# Patient Record
Sex: MALE | Race: BLACK OR AFRICAN AMERICAN | NOT HISPANIC OR LATINO | URBAN - METROPOLITAN AREA
[De-identification: names, ages, dates, MRNs, and addresses within clinical notes are randomized per-mention and may not be internally consistent; named-entity substitution may affect disease eponyms.]

---

## 2021-12-02 ENCOUNTER — INPATIENT (INPATIENT)
Facility: HOSPITAL | Age: 61
LOS: 2 days | Discharge: HOME | End: 2021-12-05
Attending: STUDENT IN AN ORGANIZED HEALTH CARE EDUCATION/TRAINING PROGRAM | Admitting: STUDENT IN AN ORGANIZED HEALTH CARE EDUCATION/TRAINING PROGRAM
Payer: MEDICAID

## 2021-12-02 VITALS
SYSTOLIC BLOOD PRESSURE: 117 MMHG | TEMPERATURE: 101 F | DIASTOLIC BLOOD PRESSURE: 79 MMHG | RESPIRATION RATE: 18 BRPM | OXYGEN SATURATION: 95 % | HEART RATE: 107 BPM

## 2021-12-02 LAB
ALBUMIN SERPL ELPH-MCNC: 3.2 G/DL — LOW (ref 3.3–5)
ALP SERPL-CCNC: 104 U/L — SIGNIFICANT CHANGE UP (ref 40–120)
ALT FLD-CCNC: 7 U/L — SIGNIFICANT CHANGE UP (ref 4–41)
ANION GAP SERPL CALC-SCNC: 10 MMOL/L — SIGNIFICANT CHANGE UP (ref 7–14)
AST SERPL-CCNC: 9 U/L — SIGNIFICANT CHANGE UP (ref 4–40)
BASE EXCESS BLDV CALC-SCNC: 4.9 MMOL/L — HIGH (ref -2–3)
BASOPHILS # BLD AUTO: 0.06 K/UL — SIGNIFICANT CHANGE UP (ref 0–0.2)
BASOPHILS NFR BLD AUTO: 0.2 % — SIGNIFICANT CHANGE UP (ref 0–2)
BILIRUB SERPL-MCNC: 0.6 MG/DL — SIGNIFICANT CHANGE UP (ref 0.2–1.2)
BLOOD GAS VENOUS COMPREHENSIVE RESULT: SIGNIFICANT CHANGE UP
BUN SERPL-MCNC: 16 MG/DL — SIGNIFICANT CHANGE UP (ref 7–23)
CALCIUM SERPL-MCNC: 8.9 MG/DL — SIGNIFICANT CHANGE UP (ref 8.4–10.5)
CHLORIDE BLDV-SCNC: 98 MMOL/L — SIGNIFICANT CHANGE UP (ref 96–108)
CHLORIDE SERPL-SCNC: 99 MMOL/L — SIGNIFICANT CHANGE UP (ref 98–107)
CO2 BLDV-SCNC: 30.5 MMOL/L — HIGH (ref 22–26)
CO2 SERPL-SCNC: 26 MMOL/L — SIGNIFICANT CHANGE UP (ref 22–31)
CREAT SERPL-MCNC: 0.96 MG/DL — SIGNIFICANT CHANGE UP (ref 0.5–1.3)
EOSINOPHIL # BLD AUTO: 0.02 K/UL — SIGNIFICANT CHANGE UP (ref 0–0.5)
EOSINOPHIL NFR BLD AUTO: 0.1 % — SIGNIFICANT CHANGE UP (ref 0–6)
GAS PNL BLDV: 130 MMOL/L — LOW (ref 136–145)
GAS PNL BLDV: SIGNIFICANT CHANGE UP
GLUCOSE BLDV-MCNC: 102 MG/DL — HIGH (ref 70–99)
GLUCOSE SERPL-MCNC: 100 MG/DL — HIGH (ref 70–99)
HCO3 BLDV-SCNC: 29 MMOL/L — SIGNIFICANT CHANGE UP (ref 22–29)
HCT VFR BLD CALC: 43.1 % — SIGNIFICANT CHANGE UP (ref 39–50)
HCT VFR BLDA CALC: 42 % — SIGNIFICANT CHANGE UP (ref 39–51)
HGB BLD CALC-MCNC: 14 G/DL — SIGNIFICANT CHANGE UP (ref 13–17)
HGB BLD-MCNC: 14.1 G/DL — SIGNIFICANT CHANGE UP (ref 13–17)
IANC: 21.38 K/UL — HIGH (ref 1.5–8.5)
IMM GRANULOCYTES NFR BLD AUTO: 0.8 % — SIGNIFICANT CHANGE UP (ref 0–1.5)
LACTATE BLDV-MCNC: 2.3 MMOL/L — HIGH (ref 0.5–2)
LIDOCAIN IGE QN: 11 U/L — SIGNIFICANT CHANGE UP (ref 7–60)
LYMPHOCYTES # BLD AUTO: 1.75 K/UL — SIGNIFICANT CHANGE UP (ref 1–3.3)
LYMPHOCYTES # BLD AUTO: 7.1 % — LOW (ref 13–44)
MCHC RBC-ENTMCNC: 28.6 PG — SIGNIFICANT CHANGE UP (ref 27–34)
MCHC RBC-ENTMCNC: 32.7 GM/DL — SIGNIFICANT CHANGE UP (ref 32–36)
MCV RBC AUTO: 87.4 FL — SIGNIFICANT CHANGE UP (ref 80–100)
MONOCYTES # BLD AUTO: 1.36 K/UL — HIGH (ref 0–0.9)
MONOCYTES NFR BLD AUTO: 5.5 % — SIGNIFICANT CHANGE UP (ref 2–14)
NEUTROPHILS # BLD AUTO: 21.38 K/UL — HIGH (ref 1.8–7.4)
NEUTROPHILS NFR BLD AUTO: 86.3 % — HIGH (ref 43–77)
NRBC # BLD: 0 /100 WBCS — SIGNIFICANT CHANGE UP
NRBC # FLD: 0 K/UL — SIGNIFICANT CHANGE UP
PCO2 BLDV: 41 MMHG — LOW (ref 42–55)
PH BLDV: 7.46 — HIGH (ref 7.32–7.43)
PLATELET # BLD AUTO: 292 K/UL — SIGNIFICANT CHANGE UP (ref 150–400)
PO2 BLDV: 50 MMHG — SIGNIFICANT CHANGE UP
POTASSIUM BLDV-SCNC: 5.3 MMOL/L — HIGH (ref 3.5–5.1)
POTASSIUM SERPL-MCNC: 4.6 MMOL/L — SIGNIFICANT CHANGE UP (ref 3.5–5.3)
POTASSIUM SERPL-SCNC: 4.6 MMOL/L — SIGNIFICANT CHANGE UP (ref 3.5–5.3)
PROT SERPL-MCNC: 6.4 G/DL — SIGNIFICANT CHANGE UP (ref 6–8.3)
RBC # BLD: 4.93 M/UL — SIGNIFICANT CHANGE UP (ref 4.2–5.8)
RBC # FLD: 12.5 % — SIGNIFICANT CHANGE UP (ref 10.3–14.5)
SAO2 % BLDV: 85 % — SIGNIFICANT CHANGE UP
SODIUM SERPL-SCNC: 135 MMOL/L — SIGNIFICANT CHANGE UP (ref 135–145)
WBC # BLD: 24.77 K/UL — HIGH (ref 3.8–10.5)
WBC # FLD AUTO: 24.77 K/UL — HIGH (ref 3.8–10.5)

## 2021-12-02 PROCEDURE — 93010 ELECTROCARDIOGRAM REPORT: CPT

## 2021-12-02 PROCEDURE — 99284 EMERGENCY DEPT VISIT MOD MDM: CPT | Mod: 25

## 2021-12-02 PROCEDURE — 71046 X-RAY EXAM CHEST 2 VIEWS: CPT | Mod: 26

## 2021-12-02 RX ORDER — MORPHINE SULFATE 50 MG/1
4 CAPSULE, EXTENDED RELEASE ORAL ONCE
Refills: 0 | Status: DISCONTINUED | OUTPATIENT
Start: 2021-12-02 | End: 2021-12-02

## 2021-12-02 RX ORDER — SODIUM CHLORIDE 9 MG/ML
1000 INJECTION, SOLUTION INTRAVENOUS ONCE
Refills: 0 | Status: COMPLETED | OUTPATIENT
Start: 2021-12-02 | End: 2021-12-02

## 2021-12-02 RX ORDER — PIPERACILLIN AND TAZOBACTAM 4; .5 G/20ML; G/20ML
3.38 INJECTION, POWDER, LYOPHILIZED, FOR SOLUTION INTRAVENOUS ONCE
Refills: 0 | Status: COMPLETED | OUTPATIENT
Start: 2021-12-02 | End: 2021-12-02

## 2021-12-02 RX ADMIN — MORPHINE SULFATE 4 MILLIGRAM(S): 50 CAPSULE, EXTENDED RELEASE ORAL at 22:57

## 2021-12-02 RX ADMIN — MORPHINE SULFATE 4 MILLIGRAM(S): 50 CAPSULE, EXTENDED RELEASE ORAL at 21:01

## 2021-12-02 RX ADMIN — MORPHINE SULFATE 4 MILLIGRAM(S): 50 CAPSULE, EXTENDED RELEASE ORAL at 23:13

## 2021-12-02 RX ADMIN — PIPERACILLIN AND TAZOBACTAM 200 GRAM(S): 4; .5 INJECTION, POWDER, LYOPHILIZED, FOR SOLUTION INTRAVENOUS at 23:53

## 2021-12-02 RX ADMIN — SODIUM CHLORIDE 1000 MILLILITER(S): 9 INJECTION, SOLUTION INTRAVENOUS at 22:21

## 2021-12-02 NOTE — ED ADULT NURSE NOTE - NSIMPLEMENTINTERV_GEN_ALL_ED
Implemented All Universal Safety Interventions:  [place] to call system. Call bell, personal items and telephone within reach. Instruct patient to call for assistance. Room bathroom lighting operational. Non-slip footwear when patient is off stretcher. Physically safe environment: no spills, clutter or unnecessary equipment. Stretcher in lowest position, wheels locked, appropriate side rails in place.

## 2021-12-02 NOTE — ED PROVIDER NOTE - ATTENDING CONTRIBUTION TO CARE
Tay Finley DO:  patient seen and evaluated with the PA.  I was present for key portions of the History & Physical, and I agree with the Impression & Plan. 60 yo m pmh IDDM, cholecystectomy, crack cocaine abuse, pw one month of abd pain. diffuse, worse in epigastric, unk exacerbating/remitting sx. no prior hx of similar sx. associated w/ loose stools. denies frequent etoh use. reports intermittent med compliance including insulin, states he just does not want to take the meds. reports 10-20 lb non intentional weight loss in last month, due to decreased appetite. may be having night sweats, unsure. arrives hds, well appearing. mild diffuse abd ttp and guarding. will eval for pancreatitis, possible CA, colitis less likely. will likely require GI fu if w/u non actionable and pain controlled.

## 2021-12-02 NOTE — ED PROVIDER NOTE - CLINICAL SUMMARY MEDICAL DECISION MAKING FREE TEXT BOX
62 y/o M pmh IDDM, cholecystectomy, crack cocaine abuse c/o epigastric abd pain x 1 month. Intermittent, worse with movement. Pt also endorsing decreased appetite.     r/o pancreatitis  -labs, ua, ctap, morphine

## 2021-12-02 NOTE — ED ADULT NURSE NOTE - OBJECTIVE STATEMENT
Patient received to room 4. Patient is a&ox4 ambulatory at baseline. Patient c/o of abdominal pain for 3 weeks. Patient pmh of cholecystomy, cocaine use. Patient has IV placed morphine given . denies chest pain, n/v.

## 2021-12-02 NOTE — ED PROVIDER NOTE - OBJECTIVE STATEMENT
60 y/o M pmh IDDM, cholecystectomy, crack cocaine abuse c/o epigastric abd pain x 1 month. Intermittent, worse with movement. Described as burning, sharp. Did not take anything for pain. Pt also endorsing decreased appetite. Had nausea and one episode of vomiting today. Pt endorsing constipation relieved with a laxative. Pt reports poor with insulin. C/o b/l rib pain and back pain. Pt is vaccinated, Denies fever, chills, cp, sob, dysuria, hematuria.

## 2021-12-02 NOTE — ED ADULT TRIAGE NOTE - CCCP TRG CHIEF CMPLNT
started 2 weeks ago was at MD office for eval of N&V and abd pain and some chest discomfort/abdominal pain

## 2021-12-02 NOTE — ED ADULT TRIAGE NOTE - CHIEF COMPLAINT QUOTE
md called EMS for pt to be brought to hospital for further eval f/s in field 200   IVF 200cc n/s #20 left fore arm  had hematuria several months ago and smokes crack cocaine yesterday

## 2021-12-02 NOTE — ED PROVIDER NOTE - PROGRESS NOTE DETAILS
FRAN Arellano: Pt was reassessed, still in pain, discussed case with surgery, will eval pt shortly. July Pritchard MD, Attending: Patient received at attending sign out from Dr. Sánchez, intra abd abcess, right lowr abd, surgical vs. IR drain, pt is placed NPO, surgery consult has been placed. rpt lactate, fluid, zosyn will be given q6.

## 2021-12-03 ENCOUNTER — TRANSCRIPTION ENCOUNTER (OUTPATIENT)
Age: 61
End: 2021-12-03

## 2021-12-03 DIAGNOSIS — K65.1 PERITONEAL ABSCESS: ICD-10-CM

## 2021-12-03 DIAGNOSIS — Z90.49 ACQUIRED ABSENCE OF OTHER SPECIFIED PARTS OF DIGESTIVE TRACT: Chronic | ICD-10-CM

## 2021-12-03 LAB
ANION GAP SERPL CALC-SCNC: 9 MMOL/L — SIGNIFICANT CHANGE UP (ref 7–14)
APTT BLD: 37.7 SEC — HIGH (ref 27–36.3)
B PERT DNA SPEC QL NAA+PROBE: SIGNIFICANT CHANGE UP
B PERT+PARAPERT DNA PNL SPEC NAA+PROBE: SIGNIFICANT CHANGE UP
BASE EXCESS BLDV CALC-SCNC: 2.2 MMOL/L — SIGNIFICANT CHANGE UP (ref -2–3)
BLD GP AB SCN SERPL QL: NEGATIVE — SIGNIFICANT CHANGE UP
BLOOD GAS VENOUS COMPREHENSIVE RESULT: SIGNIFICANT CHANGE UP
BORDETELLA PARAPERTUSSIS (RAPRVP): SIGNIFICANT CHANGE UP
BUN SERPL-MCNC: 12 MG/DL — SIGNIFICANT CHANGE UP (ref 7–23)
C PNEUM DNA SPEC QL NAA+PROBE: SIGNIFICANT CHANGE UP
CALCIUM SERPL-MCNC: 8.8 MG/DL — SIGNIFICANT CHANGE UP (ref 8.4–10.5)
CHLORIDE BLDV-SCNC: 99 MMOL/L — SIGNIFICANT CHANGE UP (ref 96–108)
CHLORIDE SERPL-SCNC: 98 MMOL/L — SIGNIFICANT CHANGE UP (ref 98–107)
CO2 BLDV-SCNC: 28.6 MMOL/L — HIGH (ref 22–26)
CO2 SERPL-SCNC: 27 MMOL/L — SIGNIFICANT CHANGE UP (ref 22–31)
CREAT SERPL-MCNC: 0.96 MG/DL — SIGNIFICANT CHANGE UP (ref 0.5–1.3)
FLUAV SUBTYP SPEC NAA+PROBE: SIGNIFICANT CHANGE UP
FLUBV RNA SPEC QL NAA+PROBE: SIGNIFICANT CHANGE UP
GAS PNL BLDV: 130 MMOL/L — LOW (ref 136–145)
GLUCOSE BLDC GLUCOMTR-MCNC: 139 MG/DL — HIGH (ref 70–99)
GLUCOSE BLDC GLUCOMTR-MCNC: 245 MG/DL — HIGH (ref 70–99)
GLUCOSE BLDC GLUCOMTR-MCNC: 297 MG/DL — HIGH (ref 70–99)
GLUCOSE BLDC GLUCOMTR-MCNC: 73 MG/DL — SIGNIFICANT CHANGE UP (ref 70–99)
GLUCOSE BLDC GLUCOMTR-MCNC: 96 MG/DL — SIGNIFICANT CHANGE UP (ref 70–99)
GLUCOSE BLDV-MCNC: 73 MG/DL — SIGNIFICANT CHANGE UP (ref 70–99)
GLUCOSE SERPL-MCNC: 135 MG/DL — HIGH (ref 70–99)
GRAM STN FLD: SIGNIFICANT CHANGE UP
HADV DNA SPEC QL NAA+PROBE: SIGNIFICANT CHANGE UP
HCO3 BLDV-SCNC: 27 MMOL/L — SIGNIFICANT CHANGE UP (ref 22–29)
HCOV 229E RNA SPEC QL NAA+PROBE: SIGNIFICANT CHANGE UP
HCOV HKU1 RNA SPEC QL NAA+PROBE: SIGNIFICANT CHANGE UP
HCOV NL63 RNA SPEC QL NAA+PROBE: SIGNIFICANT CHANGE UP
HCOV OC43 RNA SPEC QL NAA+PROBE: SIGNIFICANT CHANGE UP
HCT VFR BLD CALC: 40.9 % — SIGNIFICANT CHANGE UP (ref 39–50)
HCT VFR BLDA CALC: 40 % — SIGNIFICANT CHANGE UP (ref 39–51)
HGB BLD CALC-MCNC: 13.4 G/DL — SIGNIFICANT CHANGE UP (ref 13–17)
HGB BLD-MCNC: 13.7 G/DL — SIGNIFICANT CHANGE UP (ref 13–17)
HIV 1+2 AB+HIV1 P24 AG SERPL QL IA: SIGNIFICANT CHANGE UP
HMPV RNA SPEC QL NAA+PROBE: SIGNIFICANT CHANGE UP
HPIV1 RNA SPEC QL NAA+PROBE: SIGNIFICANT CHANGE UP
HPIV2 RNA SPEC QL NAA+PROBE: SIGNIFICANT CHANGE UP
HPIV3 RNA SPEC QL NAA+PROBE: SIGNIFICANT CHANGE UP
HPIV4 RNA SPEC QL NAA+PROBE: SIGNIFICANT CHANGE UP
INR BLD: 1.3 RATIO — HIGH (ref 0.88–1.16)
LACTATE BLDV-MCNC: 0.8 MMOL/L — SIGNIFICANT CHANGE UP (ref 0.5–2)
LACTATE BLDV-MCNC: 2.1 MMOL/L — HIGH (ref 0.5–2)
M PNEUMO DNA SPEC QL NAA+PROBE: SIGNIFICANT CHANGE UP
MAGNESIUM SERPL-MCNC: 2.1 MG/DL — SIGNIFICANT CHANGE UP (ref 1.6–2.6)
MCHC RBC-ENTMCNC: 28.7 PG — SIGNIFICANT CHANGE UP (ref 27–34)
MCHC RBC-ENTMCNC: 33.5 GM/DL — SIGNIFICANT CHANGE UP (ref 32–36)
MCV RBC AUTO: 85.7 FL — SIGNIFICANT CHANGE UP (ref 80–100)
NRBC # BLD: 0 /100 WBCS — SIGNIFICANT CHANGE UP
NRBC # FLD: 0 K/UL — SIGNIFICANT CHANGE UP
PCO2 BLDV: 43 MMHG — SIGNIFICANT CHANGE UP (ref 42–55)
PH BLDV: 7.41 — SIGNIFICANT CHANGE UP (ref 7.32–7.43)
PHOSPHATE SERPL-MCNC: 3.2 MG/DL — SIGNIFICANT CHANGE UP (ref 2.5–4.5)
PLATELET # BLD AUTO: 268 K/UL — SIGNIFICANT CHANGE UP (ref 150–400)
PO2 BLDV: 70 MMHG — SIGNIFICANT CHANGE UP
POTASSIUM BLDV-SCNC: 4 MMOL/L — SIGNIFICANT CHANGE UP (ref 3.5–5.1)
POTASSIUM SERPL-MCNC: 4.4 MMOL/L — SIGNIFICANT CHANGE UP (ref 3.5–5.3)
POTASSIUM SERPL-SCNC: 4.4 MMOL/L — SIGNIFICANT CHANGE UP (ref 3.5–5.3)
PROTHROM AB SERPL-ACNC: 14.8 SEC — HIGH (ref 10.6–13.6)
RAPID RVP RESULT: SIGNIFICANT CHANGE UP
RBC # BLD: 4.77 M/UL — SIGNIFICANT CHANGE UP (ref 4.2–5.8)
RBC # FLD: 12.7 % — SIGNIFICANT CHANGE UP (ref 10.3–14.5)
RH IG SCN BLD-IMP: POSITIVE — SIGNIFICANT CHANGE UP
RSV RNA SPEC QL NAA+PROBE: SIGNIFICANT CHANGE UP
RV+EV RNA SPEC QL NAA+PROBE: SIGNIFICANT CHANGE UP
SAO2 % BLDV: 94.6 % — SIGNIFICANT CHANGE UP
SARS-COV-2 RNA SPEC QL NAA+PROBE: SIGNIFICANT CHANGE UP
SODIUM SERPL-SCNC: 134 MMOL/L — LOW (ref 135–145)
SPECIMEN SOURCE: SIGNIFICANT CHANGE UP
WBC # BLD: 22.08 K/UL — HIGH (ref 3.8–10.5)
WBC # FLD AUTO: 22.08 K/UL — HIGH (ref 3.8–10.5)

## 2021-12-03 PROCEDURE — G1004: CPT

## 2021-12-03 PROCEDURE — 49406 IMAGE CATH FLUID PERI/RETRO: CPT

## 2021-12-03 PROCEDURE — 74177 CT ABD & PELVIS W/CONTRAST: CPT | Mod: 26,MG

## 2021-12-03 PROCEDURE — 99222 1ST HOSP IP/OBS MODERATE 55: CPT | Mod: GC

## 2021-12-03 RX ORDER — ENOXAPARIN SODIUM 100 MG/ML
40 INJECTION SUBCUTANEOUS EVERY 24 HOURS
Refills: 0 | Status: DISCONTINUED | OUTPATIENT
Start: 2021-12-03 | End: 2021-12-05

## 2021-12-03 RX ORDER — OXYCODONE HYDROCHLORIDE 5 MG/1
5 TABLET ORAL EVERY 4 HOURS
Refills: 0 | Status: DISCONTINUED | OUTPATIENT
Start: 2021-12-03 | End: 2021-12-04

## 2021-12-03 RX ORDER — DEXTROSE MONOHYDRATE, SODIUM CHLORIDE, AND POTASSIUM CHLORIDE 50; .745; 4.5 G/1000ML; G/1000ML; G/1000ML
1000 INJECTION, SOLUTION INTRAVENOUS
Refills: 0 | Status: DISCONTINUED | OUTPATIENT
Start: 2021-12-03 | End: 2021-12-04

## 2021-12-03 RX ORDER — DEXTROSE 50 % IN WATER 50 %
25 SYRINGE (ML) INTRAVENOUS ONCE
Refills: 0 | Status: DISCONTINUED | OUTPATIENT
Start: 2021-12-03 | End: 2021-12-03

## 2021-12-03 RX ORDER — DEXTROSE 50 % IN WATER 50 %
25 SYRINGE (ML) INTRAVENOUS ONCE
Refills: 0 | Status: COMPLETED | OUTPATIENT
Start: 2021-12-03 | End: 2021-12-03

## 2021-12-03 RX ORDER — HYDROMORPHONE HYDROCHLORIDE 2 MG/ML
1 INJECTION INTRAMUSCULAR; INTRAVENOUS; SUBCUTANEOUS ONCE
Refills: 0 | Status: DISCONTINUED | OUTPATIENT
Start: 2021-12-03 | End: 2021-12-03

## 2021-12-03 RX ORDER — GABAPENTIN 400 MG/1
300 CAPSULE ORAL THREE TIMES A DAY
Refills: 0 | Status: DISCONTINUED | OUTPATIENT
Start: 2021-12-03 | End: 2021-12-05

## 2021-12-03 RX ORDER — INSULIN LISPRO 100/ML
VIAL (ML) SUBCUTANEOUS EVERY 4 HOURS
Refills: 0 | Status: DISCONTINUED | OUTPATIENT
Start: 2021-12-03 | End: 2021-12-03

## 2021-12-03 RX ORDER — HYDROMORPHONE HYDROCHLORIDE 2 MG/ML
0.25 INJECTION INTRAMUSCULAR; INTRAVENOUS; SUBCUTANEOUS EVERY 4 HOURS
Refills: 0 | Status: DISCONTINUED | OUTPATIENT
Start: 2021-12-03 | End: 2021-12-03

## 2021-12-03 RX ORDER — HYDROMORPHONE HYDROCHLORIDE 2 MG/ML
0.5 INJECTION INTRAMUSCULAR; INTRAVENOUS; SUBCUTANEOUS EVERY 4 HOURS
Refills: 0 | Status: DISCONTINUED | OUTPATIENT
Start: 2021-12-03 | End: 2021-12-03

## 2021-12-03 RX ORDER — INSULIN GLARGINE 100 [IU]/ML
20 INJECTION, SOLUTION SUBCUTANEOUS AT BEDTIME
Refills: 0 | Status: DISCONTINUED | OUTPATIENT
Start: 2021-12-03 | End: 2021-12-03

## 2021-12-03 RX ORDER — KETOROLAC TROMETHAMINE 30 MG/ML
15 SYRINGE (ML) INJECTION EVERY 6 HOURS
Refills: 0 | Status: DISCONTINUED | OUTPATIENT
Start: 2021-12-03 | End: 2021-12-04

## 2021-12-03 RX ORDER — SODIUM CHLORIDE 9 MG/ML
1000 INJECTION, SOLUTION INTRAVENOUS
Refills: 0 | Status: DISCONTINUED | OUTPATIENT
Start: 2021-12-03 | End: 2021-12-03

## 2021-12-03 RX ORDER — DEXTROSE 50 % IN WATER 50 %
15 SYRINGE (ML) INTRAVENOUS ONCE
Refills: 0 | Status: DISCONTINUED | OUTPATIENT
Start: 2021-12-03 | End: 2021-12-03

## 2021-12-03 RX ORDER — PIPERACILLIN AND TAZOBACTAM 4; .5 G/20ML; G/20ML
3.38 INJECTION, POWDER, LYOPHILIZED, FOR SOLUTION INTRAVENOUS EVERY 8 HOURS
Refills: 0 | Status: DISCONTINUED | OUTPATIENT
Start: 2021-12-03 | End: 2021-12-05

## 2021-12-03 RX ORDER — GLUCAGON INJECTION, SOLUTION 0.5 MG/.1ML
1 INJECTION, SOLUTION SUBCUTANEOUS ONCE
Refills: 0 | Status: DISCONTINUED | OUTPATIENT
Start: 2021-12-03 | End: 2021-12-03

## 2021-12-03 RX ORDER — ACETAMINOPHEN 500 MG
975 TABLET ORAL EVERY 6 HOURS
Refills: 0 | Status: DISCONTINUED | OUTPATIENT
Start: 2021-12-03 | End: 2021-12-05

## 2021-12-03 RX ORDER — KETOROLAC TROMETHAMINE 30 MG/ML
15 SYRINGE (ML) INJECTION EVERY 6 HOURS
Refills: 0 | Status: DISCONTINUED | OUTPATIENT
Start: 2021-12-03 | End: 2021-12-03

## 2021-12-03 RX ORDER — INSULIN LISPRO 100/ML
VIAL (ML) SUBCUTANEOUS
Refills: 0 | Status: DISCONTINUED | OUTPATIENT
Start: 2021-12-03 | End: 2021-12-04

## 2021-12-03 RX ORDER — DEXTROSE 50 % IN WATER 50 %
12.5 SYRINGE (ML) INTRAVENOUS ONCE
Refills: 0 | Status: DISCONTINUED | OUTPATIENT
Start: 2021-12-03 | End: 2021-12-03

## 2021-12-03 RX ORDER — ACETAMINOPHEN 500 MG
1000 TABLET ORAL EVERY 6 HOURS
Refills: 0 | Status: DISCONTINUED | OUTPATIENT
Start: 2021-12-03 | End: 2021-12-03

## 2021-12-03 RX ORDER — INSULIN LISPRO 100/ML
VIAL (ML) SUBCUTANEOUS EVERY 6 HOURS
Refills: 0 | Status: DISCONTINUED | OUTPATIENT
Start: 2021-12-03 | End: 2021-12-03

## 2021-12-03 RX ADMIN — Medication 1000 MILLIGRAM(S): at 08:34

## 2021-12-03 RX ADMIN — OXYCODONE HYDROCHLORIDE 5 MILLIGRAM(S): 5 TABLET ORAL at 22:50

## 2021-12-03 RX ADMIN — ENOXAPARIN SODIUM 40 MILLIGRAM(S): 100 INJECTION SUBCUTANEOUS at 17:14

## 2021-12-03 RX ADMIN — GABAPENTIN 300 MILLIGRAM(S): 400 CAPSULE ORAL at 05:40

## 2021-12-03 RX ADMIN — GABAPENTIN 300 MILLIGRAM(S): 400 CAPSULE ORAL at 21:53

## 2021-12-03 RX ADMIN — PIPERACILLIN AND TAZOBACTAM 25 GRAM(S): 4; .5 INJECTION, POWDER, LYOPHILIZED, FOR SOLUTION INTRAVENOUS at 07:45

## 2021-12-03 RX ADMIN — HYDROMORPHONE HYDROCHLORIDE 1 MILLIGRAM(S): 2 INJECTION INTRAMUSCULAR; INTRAVENOUS; SUBCUTANEOUS at 03:01

## 2021-12-03 RX ADMIN — OXYCODONE HYDROCHLORIDE 5 MILLIGRAM(S): 5 TABLET ORAL at 16:10

## 2021-12-03 RX ADMIN — DEXTROSE MONOHYDRATE, SODIUM CHLORIDE, AND POTASSIUM CHLORIDE 100 MILLILITER(S): 50; .745; 4.5 INJECTION, SOLUTION INTRAVENOUS at 05:40

## 2021-12-03 RX ADMIN — Medication 25 GRAM(S): at 05:00

## 2021-12-03 RX ADMIN — SODIUM CHLORIDE 1000 MILLILITER(S): 9 INJECTION, SOLUTION INTRAVENOUS at 00:20

## 2021-12-03 RX ADMIN — Medication 400 MILLIGRAM(S): at 08:04

## 2021-12-03 RX ADMIN — DEXTROSE MONOHYDRATE, SODIUM CHLORIDE, AND POTASSIUM CHLORIDE 100 MILLILITER(S): 50; .745; 4.5 INJECTION, SOLUTION INTRAVENOUS at 21:53

## 2021-12-03 RX ADMIN — Medication 3: at 22:29

## 2021-12-03 RX ADMIN — Medication 2: at 17:10

## 2021-12-03 RX ADMIN — Medication 15 MILLIGRAM(S): at 23:33

## 2021-12-03 RX ADMIN — Medication 975 MILLIGRAM(S): at 17:10

## 2021-12-03 RX ADMIN — OXYCODONE HYDROCHLORIDE 5 MILLIGRAM(S): 5 TABLET ORAL at 15:27

## 2021-12-03 RX ADMIN — GABAPENTIN 300 MILLIGRAM(S): 400 CAPSULE ORAL at 15:00

## 2021-12-03 RX ADMIN — PIPERACILLIN AND TAZOBACTAM 25 GRAM(S): 4; .5 INJECTION, POWDER, LYOPHILIZED, FOR SOLUTION INTRAVENOUS at 15:01

## 2021-12-03 RX ADMIN — Medication 15 MILLIGRAM(S): at 17:54

## 2021-12-03 RX ADMIN — Medication 15 MILLIGRAM(S): at 17:10

## 2021-12-03 RX ADMIN — PIPERACILLIN AND TAZOBACTAM 25 GRAM(S): 4; .5 INJECTION, POWDER, LYOPHILIZED, FOR SOLUTION INTRAVENOUS at 21:53

## 2021-12-03 RX ADMIN — Medication 975 MILLIGRAM(S): at 17:54

## 2021-12-03 RX ADMIN — Medication 975 MILLIGRAM(S): at 23:33

## 2021-12-03 RX ADMIN — OXYCODONE HYDROCHLORIDE 5 MILLIGRAM(S): 5 TABLET ORAL at 21:53

## 2021-12-03 NOTE — CHART NOTE - NSCHARTNOTEFT_GEN_A_CORE
61M IDDM, with perforated appendicitis with large abscess.    plan for abscess drainage today. can put order for IR procedure under Dr. Machuca  keep NPO  f/u AM labs      Dimitri Ivy MD  Interventional Radiology PGY4    -EMERGENT: [facility] IR Pager: [phone].  [facility] IR Pager: [phone] [id number]  -Nonemergent consults:  [facility] order "Consult- Interventional Radiology"  -Available on [employer] TEAMS for questions

## 2021-12-03 NOTE — DISCHARGE NOTE PROVIDER - NSDCCPTREATMENT_GEN_ALL_CORE_FT
PRINCIPAL PROCEDURE  Procedure: Drainage of appendix  Findings and Treatment: IR drainage of appendix abscess

## 2021-12-03 NOTE — H&P ADULT - HISTORY OF PRESENT ILLNESS
61M with history of insulin dependent diabetes, laparoscopic cholecystectomy couple years ago presented with 3 weeks of worsening abdominal pain. Per patient, his symptoms when first started 3 weeks ago were described as "rib pain". Then he noted he had more pain in his "stomach". He initially didn't think it was serious. He came to ER today because the pain has been getting more severe. He also reports anorexia. His last bowel movement was 1 week ago but passing flatus.

## 2021-12-03 NOTE — PROCEDURE NOTE - PLAN
- monitor drain output: strict outputs  - 5cc NS forward flush only every 12 hours, do not aspirate  - monitor for signs of bleeding or sepsis  - may advance diet and resume preprocedure orders per primary team  - VIR will follow

## 2021-12-03 NOTE — DISCHARGE NOTE PROVIDER - NSDCCPCAREPLAN_GEN_ALL_CORE_FT
PRINCIPAL DISCHARGE DIAGNOSIS  Diagnosis: Abscess of abdominal cavity  Assessment and Plan of Treatment:

## 2021-12-03 NOTE — PROGRESS NOTE ADULT - SUBJECTIVE AND OBJECTIVE BOX
SUBJECTIVE: Pt seen and examined on rounds with team. No acute events overnight.        OBJECTIVE    PHYSICAL EXAM:   General: NAD, Lying in bed   Neuro: Awake and alert  ABD: soft, ND, TTP RLQ/LLQ        VITALS  T(C): 36.9 (12-03-21 @ 05:28), Max: 38.3 (12-02-21 @ 18:33)  HR: 87 (12-03-21 @ 05:28) (86 - 107)  BP: 131/81 (12-03-21 @ 05:28) (117/79 - 137/77)  RR: 17 (12-03-21 @ 05:28) (17 - 20)  SpO2: 100% (12-03-21 @ 05:28) (95% - 100%)  CAPILLARY BLOOD GLUCOSE      POCT Blood Glucose.: 139 mg/dL (03 Dec 2021 07:40)  POCT Blood Glucose.: 73 mg/dL (03 Dec 2021 04:44)      Is/Os      MEDICATIONS (STANDING): acetaminophen   IVPB .. 1000 milliGRAM(s) IV Intermittent every 6 hours  dextrose 40% Gel 15 Gram(s) Oral once  dextrose 5% + sodium chloride 0.45% with potassium chloride 20 mEq/L 1000 milliLiter(s) IV Continuous <Continuous>  dextrose 5%. 1000 milliLiter(s) IV Continuous <Continuous>  dextrose 5%. 1000 milliLiter(s) IV Continuous <Continuous>  dextrose 50% Injectable 25 Gram(s) IV Push once  dextrose 50% Injectable 12.5 Gram(s) IV Push once  dextrose 50% Injectable 25 Gram(s) IV Push once  gabapentin 300 milliGRAM(s) Oral three times a day  glucagon  Injectable 1 milliGRAM(s) IntraMuscular once  insulin lispro (ADMELOG) corrective regimen sliding scale   SubCutaneous every 4 hours  piperacillin/tazobactam IVPB.. 3.375 Gram(s) IV Intermittent every 8 hours    MEDICATIONS (PRN):    LABS  CBC (12-02 @ 21:48)                              14.1                           24.77<H>  )----------------(  292        86.3<H>% Neutrophils, 7.1<L>% Lymphocytes, ANC: 21.38<H>                              43.1      BMP (12-02 @ 21:48)             135     |  99      |  16    		Ca++ --      Ca 8.9                ---------------------------------( 100<H>		Mg --                 4.6     |  26      |  0.96  			Ph --        LFTs (12-02 @ 21:48)      TPro 6.4 / Alb 3.2<L> / TBili 0.6 / DBili -- / AST 9 / ALT 7 / AlkPhos 104    Coags (12-03 @ 03:48)  aPTT 37.7<H> / INR 1.30<H> / PT 14.8<H>        VBG (12-03 @ 03:48)     7.41 / 43 / 70 / 27 / 2.2 / 94.6%     Lactate: 0.8  VBG (12-03 @ 03:07)     -- / -- / -- / -- / -- / --%     Lactate: 2.1<H>      IMAGING STUDIES

## 2021-12-03 NOTE — DISCHARGE NOTE PROVIDER - CARE PROVIDER_API CALL
Nayana Barbosa)  Surgery  [address] [place] [address]  Phone: [phone]  Fax: [phone]  Follow Up Time: 1 week    Dandy Machuca)  Interventional Radiology and Diagnostic Radiology  [address] [place] [id number]  Phone: [phone]  Fax: [phone]  Follow Up Time: 1 week

## 2021-12-03 NOTE — PATIENT PROFILE ADULT - TOBACCO CESSATION EDUCATION/COUNSELLING(PROVIDED IF TOBACCO USED IN THE PAST 30 DAYS) NON CORE MEASURE SITES
Patient denies any suicidal ideation at this time.       Claribel Hudson RN  06/10/21 [id number] Offered and patient declined

## 2021-12-03 NOTE — DISCHARGE NOTE PROVIDER - HOSPITAL COURSE
Patient is a 61 year old male with a PMHx of insulin dependent diabetes and laparoscopic cholecystectomy (a few years ago) who presented with 3 weeks of worsening abdominal pain.    On 12/3 CT Abdomen / Pelvis performed showing large abscess in the region of the terminal ileum/cecum containing gas locules and calcifications, suspicious for sequelae of perforated appendicitis.  Patient was made NPO with IV fluids.  He was started on IV Zosyn.  Patient went to IR for drainage of abscess - 80cc of pus aspirated.      ***COMPLETE UP TO 12/3*** Patient is a 61 year old male with a PMHx of insulin dependent diabetes and laparoscopic cholecystectomy (a few years ago) who presented with 3 weeks of worsening abdominal pain.    On 12/3 CT Abdomen / Pelvis performed showing large abscess in the region of the terminal ileum/cecum containing gas locules and calcifications, suspicious for sequelae of perforated appendicitis.  Patient was made NPO with IV fluids.  He was started on IV Zosyn.  Patient went to IR for drainage of abscess - 80cc of pus aspirated.  Post procedure patient was advanced to a clear liquid diet, which he tolerated well.      ***COMPLETE UP TO 12/3*** Patient is a 61 year old male with a PMHx of insulin dependent diabetes and laparoscopic cholecystectomy (a few years ago) who presented with 3 weeks of worsening abdominal pain.    On 12/3 CT Abdomen / Pelvis performed showing large abscess in the region of the terminal ileum/cecum containing gas locules and calcifications, suspicious for sequelae of perforated appendicitis.  Patient was made NPO with IV fluids.  He was started on IV Zosyn.  Patient went to IR for drainage of abscess - 80cc of pus aspirated.  Post procedure patient was advanced to a clear liquid diet, which he tolerated well.    Due to patient having an A1c of 9.1 and fluctuating glucose levels, Endocrine was consulted inpatient, and saw the patient.    On day of discharge, the patient was tolerating diet, having bowel function, ambulating well and pain controlled. Patient is a 61 year old male with a PMHx of insulin dependent diabetes and laparoscopic cholecystectomy (a few years ago) who presented with 3 weeks of worsening abdominal pain.    On 12/3 CT Abdomen / Pelvis performed showing large abscess in the region of the terminal ileum/cecum containing gas locules and calcifications, suspicious for sequelae of perforated appendicitis.  Patient was made NPO with IV fluids.  He was started on IV Zosyn.  Patient went to IR for drainage of abscess - 80cc of pus aspirated.  Post procedure patient was advanced to a clear liquid diet, which he tolerated well.    Due to patient having an A1c of 9.1 and fluctuating glucose levels, Endocrine was consulted inpatient, and saw the patient. Adjustments were made to the inpatient regimen accordingly, and the Endocrine service recommended that he be discharged on his home regimen with office followup.    During the hospital the patient had a Covid exposure via his roommate and therefore was placed on isolation precautions. Covid sent 12/4 was negative.    Infectious disease was consulted on 12/5 for PO abx regimen as the fluid cultures from the drain resulted as E. coli. They recommended 2 weeks of Augmentin, and that the patient could quarantine at home for 10 days.     On day of discharge, the patient was tolerating diet, having bowel function, ambulating well and pain controlled.

## 2021-12-03 NOTE — CHART NOTE - NSCHARTNOTEFT_GEN_A_CORE
Interventional Radiology Pre-Procedure Note    This is a 61y Male for perforated appendicitis abscess drain    NPO  Antibiotics: Zosyn  Anticoagulation held  Attending: Dr. Brigette CARDOSO negative  AM labs pending    PAST MEDICAL & SURGICAL HISTORY:  H/O insulin dependent diabetes mellitus    History of laparoscopic cholecystectomy         Vitals:Vital Signs Last 24 Hrs  T(C): 36.9 (03 Dec 2021 05:28), Max: 38.3 (02 Dec 2021 18:33)  T(F): 98.5 (03 Dec 2021 05:28), Max: 101 (02 Dec 2021 21:06)  HR: 87 (03 Dec 2021 05:28) (86 - 107)  BP: 131/81 (03 Dec 2021 05:28) (117/79 - 137/77)  BP(mean): --  RR: 17 (03 Dec 2021 05:28) (17 - 20)  SpO2: 100% (03 Dec 2021 05:28) (95% - 100%)    Allergies: Allergies    No Known Allergies    Intolerances            Labs:                         14.1   24.77 )-----------( 292      ( 02 Dec 2021 21:48 )             43.1     12-02    135  |  99  |  16  ----------------------------<  100<H>  4.6   |  26  |  0.96    Ca    8.9      02 Dec 2021 21:48    TPro  6.4  /  Alb  3.2<L>  /  TBili  0.6  /  DBili  x   /  AST  9   /  ALT  7   /  AlkPhos  104  12-02    PT/INR - ( 03 Dec 2021 03:48 )   PT: 14.8 sec;   INR: 1.30 ratio         PTT - ( 03 Dec 2021 03:48 )  PTT:37.7 sec

## 2021-12-03 NOTE — DISCHARGE NOTE PROVIDER - CARE PROVIDERS DIRECT ADDRESSES
,DirectAddress_Unknown,[email or web address]

## 2021-12-03 NOTE — H&P ADULT - ASSESSMENT
61M with IDDM and history of laparoscopic cholecystectomy presented with 3 weeks of worsening abdominal pain found to have likely perforated appendicitis with large abscess (10 x 5 cm), febrile with tachycardia in ER but normotensive, exam with local peritonitis over right lower quadrant with guarding.     - admission to Surgery B team under Dr. Barbosa, received 1 dose of zosyn in ER, will continue abx   - discussed the case with on-call IR fellow Dr. Brown, possible window for drainage, pending further review with attending radiologist am   - IV fluid resuscitation in the interim

## 2021-12-03 NOTE — PROGRESS NOTE ADULT - ASSESSMENT
61M with IDDM and history of laparoscopic cholecystectomy presented with 3 weeks of worsening abdominal pain found to have likely perforated appendicitis with large abscess (10 x 5 cm), febrile with tachycardia in ER but normotensive, exam with local peritonitis over right lower quadrant with guarding.     PLAN:  NPO, IVF  F/u IR Recs  PRN Pain control  OOBAT    B Team Surgery  [id number]

## 2021-12-03 NOTE — PATIENT PROFILE ADULT - FALL HARM RISK - HARM RISK INTERVENTIONS

## 2021-12-03 NOTE — CHART NOTE - NSCHARTNOTEFT_GEN_A_CORE
POST-OPERATIVE NOTE    Subjective:  Patient is s/p IR drainage of RLQ abdominal abscess drainage with aspiration of 80cc of pus. Reports mild pain but otherwise has no complaints. Denies CP, SOB, fevers/chills. No flatus or BM yet    Vital Signs Last 24 Hrs  T(C): 36.6 (03 Dec 2021 14:45), Max: 38.3 (02 Dec 2021 18:33)  T(F): 97.9 (03 Dec 2021 14:45), Max: 101 (02 Dec 2021 21:06)  HR: 89 (03 Dec 2021 14:45) (78 - 107)  BP: 116/68 (03 Dec 2021 14:45) (107/72 - 137/77)  BP(mean): --  RR: 17 (03 Dec 2021 14:45) (17 - 20)  SpO2: 100% (03 Dec 2021 14:45) (95% - 100%)  I&O's Detail    02 Dec 2021 07:01  -  03 Dec 2021 07:00  --------------------------------------------------------  IN:    dextrose 5% + sodium chloride 0.45% w/ Additives: 200 mL  Total IN: 200 mL    OUT:  Total OUT: 0 mL    Total NET: 200 mL      03 Dec 2021 07:01  -  03 Dec 2021 16:53  --------------------------------------------------------  IN:    dextrose 5% + sodium chloride 0.45% w/ Additives: 400 mL    IV PiggyBack: 100 mL    Oral Fluid: 240 mL  Total IN: 740 mL    OUT:    Drain (mL): 80 mL  Total OUT: 80 mL    Total NET: 660 mL        Physical Exam:   GEN: resting in bed comfortably in NAD  RESP: no increased WOB  ABD: soft, non-distended, mildly TTP in RUQ. Drain in place with minimal purulent/serous output  EXTR: warm, well-perfused without gross deformities  NEURO: awake, alert     LABS:                        13.7   22.08 )-----------( 268      ( 03 Dec 2021 08:12 )             40.9     12-03    134<L>  |  98  |  12  ----------------------------<  135<H>  4.4   |  27  |  0.96    Ca    8.8      03 Dec 2021 08:12  Phos  3.2     12-03  Mg     2.10     12-03    TPro  6.4  /  Alb  3.2<L>  /  TBili  0.6  /  DBili  x   /  AST  9   /  ALT  7   /  AlkPhos  104  12-02    PT/INR - ( 03 Dec 2021 03:48 )   PT: 14.8 sec;   INR: 1.30 ratio         PTT - ( 03 Dec 2021 03:48 )  PTT:37.7 sec  CAPILLARY BLOOD GLUCOSE      POCT Blood Glucose.: 96 mg/dL (03 Dec 2021 12:49)  POCT Blood Glucose.: 139 mg/dL (03 Dec 2021 07:40)  POCT Blood Glucose.: 73 mg/dL (03 Dec 2021 04:44)      Radiology and Additional Studies:    Assessment:  The patient is a 61y Male who is now several hours post-op from a IR drainage of RLQ abdominal abscess, recovering well.    Plan:  - F/u abscess culture  - Monitor for fevers, follow WBC curve  - Can advance to reg diet    B Team Surgery  [id number]

## 2021-12-03 NOTE — H&P ADULT - ATTENDING COMMENTS
I have reviewed the history, pertinent labs and imaging, and discussed the care with the consult resident.  More than 50% of this 55 minute encounter including face to face with the patient was spent counseling and/or coordination of care on perforated appendicitis.  Time included review of vitals, labs, imaging, discussion with consultants and coordination with the operating room/emergency department.    60yo M admitted with perforated appendicitis with 10x5cm abscess. 1 month of pain. WBC 24. Will plan for nonoperative management if possible, followed by interval appendectomy after resolution.     - NPO  - IR consult for drain   - abx     The active issues are:  1. perforated appendicitis with abscess    The Acute Care Surgery (B Team) Attending Group Practice:  Dr. Alfonso Hong, Dr. Pollo Blanton, Dr. Stevenson Morales, Dr. Phuc Sesay, Dr. Nayana Barbosa    urgent issues - spectra [id number]  nonurgent issues - [phone]  patient appointments or afterhours - [phone]

## 2021-12-03 NOTE — PRE PROCEDURE NOTE - PRE PROCEDURE EVALUATION
Interventional Radiology    61M IDDM, with perforated appendicitis with large abscess, presents for percutaneous drainage of abdominal abscess      Allergies:   Medications (Abx/Cardiac/Anticoagulation/Blood Products)  piperacillin/tazobactam IVPB..: 25 mL/Hr IV Intermittent (12-03 @ 07:45)  piperacillin/tazobactam IVPB...: 200 mL/Hr IV Intermittent (12-02 @ 23:53)    Data:    76  T(C): 36.8  HR: 78  BP: 107/72  RR: 18  SpO2: 100%    Exam  General: No acute distress  Chest: Non labored breathing  Abdomen: Non-distended  Extremities: No swelling, warm    -WBC 22.08 / HgB 13.7 / Hct 40.9 / Plt 268  -Na 134 / Cl 98 / BUN 12 / Glucose 135  -K 4.4 / CO2 27 / Cr 0.96  -ALT -- / Alk Phos -- / T.Bili --  -INR1.30    Imaging:     Plan: 61y Male presents for above procedure  -finished dose of antibiotics immediately prior to procedure  -Risks/Benefits/alternatives explained with the patient and/or healthcare proxy and witnessed informed consent obtained.

## 2021-12-03 NOTE — H&P ADULT - NSHPLABSRESULTS_GEN_ALL_CORE
CBC (12-02 @ 21:48)                          14.1                     24.77<H>  )--------------(  292        86.3<H>% Neuts, 7.1<L>% Lymphs, ANC: 21.38<H>                          43.1      BMP (12-02 @ 21:48)       135     |  99      |  16    			Ca++ --      Ca 8.9          ---------------------------------( 100<H>		Mg --           4.6     |  26      |  0.96  			Ph --        LFTs (12-02 @ 21:48)      TPro 6.4 / Alb 3.2<L> / TBili 0.6 / DBili -- / AST 9 / ALT 7 / AlkPhos 104    Coags (12-03 @ 03:48)  aPTT 37.7<H> / INR 1.30<H> / PT 14.8<H>    VBG (12-02 @ 21:46)     7.46<H> / 41<L> / 50 / 29 / 4.9<H> / 85.0%      Lactate: 2.3<H>    EXAM:  CT ABDOMEN AND PELVIS IC    PROCEDURE DATE:  Dec  3 2021     FINDINGS:  LOWER CHEST: Clear lungs. The heart is within normal limits of size. No pericardial effusion.    LIVER: Within normal limits.  BILE DUCTS: Normal caliber.  GALLBLADDER: Status post cholecystectomy.  SPLEEN: Incidental subcentimeter splenule.  PANCREAS: Within normal limits.  ADRENALS: Within normal limits.  KIDNEYS/URETERS: Symmetric enhancement. No hydroureteronephrosis or nephrolithiasis.    BLADDER: Within normal limits.  REPRODUCTIVE ORGANS: Prostate within normal limits.    BOWEL: Appendix is not well delineated. A 10.3 cm TV by 5.4 cm AP abscess is noted in the region of the terminal ileum/cecum, containing gas locules and calcifications. Lesion is noted to cause mass effect on the sigmoid colon. Thereis wall thickening of the adjacent sigmoid colon, distal ileum and cecum. No bowel obstruction. Stool burden noted throughout the colon, compatible with patient reported constipation.  PERITONEUM: No ascites.  VESSELS: Within normal limits.  RETROPERITONEUM/LYMPH NODES: No lymphadenopathy.  ABDOMINAL WALL: Within normal limits.  BONES: Bilateral L5 pars defect. Degenerative endplate sclerosis and vacuum phenomenon centered at L5-S1 is noted.    IMPRESSION:  Large abscess in the region of the terminal ileum/cecum containing gas locules and calcifications, suspicious for sequelae of perforated appendicitis.

## 2021-12-03 NOTE — DISCHARGE NOTE PROVIDER - NSDCFUADDINST_GEN_ALL_CORE_FT
DRAIN CARE: follow instructions given to you in the hospital. You should empty the drain every day (or more often if needed) and record the output.  BATHING: Please do not submerge wound underwater. You may shower and/or sponge bathe.  ACTIVITY: No heavy lifting anything more than 10-15lbs or straining. Otherwise, you may return to your usual level of physical activity. If you are taking narcotic pain medication (such as Percocet), do NOT drive a car, operate machinery or make important decisions.  DIET: Regular  MEDICATIONS: You may resume your regular medications  NOTIFY YOUR SURGEON IF: You have any bleeding that does not stop, any pus draining from your wound, any fever (over 100.4 F) or chills, persistent nausea/vomiting with inability to tolerate food or liquids, persistent diarrhea, or if your pain is not controlled on your discharge pain medications.  FOLLOW-UP:  1. Please call to make a follow-up appointment within one week of discharge   2. Please follow up with your primary care physician in one week regarding your hospitalization.  DRAIN CARE: follow instructions given to you in the hospital. You should empty the drain every day (or more often if needed) and record the output.  BATHING: Please do not submerge wound underwater. You may shower and/or sponge bathe.  ACTIVITY: No heavy lifting anything more than 10-15lbs or straining. Otherwise, you may return to your usual level of physical activity. If you are taking narcotic pain medication (such as Percocet), do NOT drive a car, operate machinery or make important decisions.  DIET: Regular  MEDICATIONS: You may resume your regular medications  NOTIFY YOUR SURGEON IF: You have any bleeding that does not stop, any pus draining from your wound, any fever (over 100.4 F) or chills, persistent nausea/vomiting with inability to tolerate food or liquids, persistent diarrhea, or if your pain is not controlled on your discharge pain medications.  FOLLOW-UP:  1. Please call to make a follow-up appointment with Dr. Barbosa within one week of discharge   2. Please follow up with your primary care physician in one week regarding your hospitalization.   3. Please make an appointment to see Dr. Machuca (Interventional Radiology) in 1 week

## 2021-12-03 NOTE — DISCHARGE NOTE PROVIDER - NSDCMRMEDTOKEN_GEN_ALL_CORE_FT
gabapentin 300 mg oral capsule: 1 cap(s) orally 3 times a day  insulin glargine 100 units/mL subcutaneous solution: 30 unit(s) subcutaneous once a day (at bedtime)  insulin lispro 100 units/mL subcutaneous solution: 10 unit(s) subcutaneous 3 times a day   amoxicillin-clavulanate 875 mg-125 mg oral tablet: 1 tab(s) orally 2 times a day MDD:2 tabs  gabapentin 300 mg oral capsule: 1 cap(s) orally 3 times a day  insulin glargine 100 units/mL subcutaneous solution: 30 unit(s) subcutaneous once a day (at bedtime)  insulin lispro 100 units/mL subcutaneous solution: 10 unit(s) subcutaneous 3 times a day

## 2021-12-03 NOTE — DISCHARGE NOTE PROVIDER - PROVIDER TOKENS
PROVIDER:[TOKEN:[[id number]],FOLLOWUP:[1 week]],PROVIDER:[TOKEN:[[id number]],FOLLOWUP:[1 week]]

## 2021-12-04 DIAGNOSIS — E11.65 TYPE 2 DIABETES MELLITUS WITH HYPERGLYCEMIA: ICD-10-CM

## 2021-12-04 DIAGNOSIS — E78.5 HYPERLIPIDEMIA, UNSPECIFIED: ICD-10-CM

## 2021-12-04 DIAGNOSIS — I10 ESSENTIAL (PRIMARY) HYPERTENSION: ICD-10-CM

## 2021-12-04 LAB
A1C WITH ESTIMATED AVERAGE GLUCOSE RESULT: 9.1 % — HIGH (ref 4–5.6)
ALBUMIN SERPL ELPH-MCNC: 2.9 G/DL — LOW (ref 3.3–5)
ALP SERPL-CCNC: 91 U/L — SIGNIFICANT CHANGE UP (ref 40–120)
ALT FLD-CCNC: 8 U/L — SIGNIFICANT CHANGE UP (ref 4–41)
ANION GAP SERPL CALC-SCNC: 9 MMOL/L — SIGNIFICANT CHANGE UP (ref 7–14)
AST SERPL-CCNC: 8 U/L — SIGNIFICANT CHANGE UP (ref 4–40)
BILIRUB SERPL-MCNC: 0.2 MG/DL — SIGNIFICANT CHANGE UP (ref 0.2–1.2)
BUN SERPL-MCNC: 16 MG/DL — SIGNIFICANT CHANGE UP (ref 7–23)
CALCIUM SERPL-MCNC: 8.4 MG/DL — SIGNIFICANT CHANGE UP (ref 8.4–10.5)
CHLORIDE SERPL-SCNC: 98 MMOL/L — SIGNIFICANT CHANGE UP (ref 98–107)
CO2 SERPL-SCNC: 26 MMOL/L — SIGNIFICANT CHANGE UP (ref 22–31)
CREAT SERPL-MCNC: 1.02 MG/DL — SIGNIFICANT CHANGE UP (ref 0.5–1.3)
ESTIMATED AVERAGE GLUCOSE: 214 — SIGNIFICANT CHANGE UP
GLUCOSE BLDC GLUCOMTR-MCNC: 170 MG/DL — HIGH (ref 70–99)
GLUCOSE BLDC GLUCOMTR-MCNC: 185 MG/DL — HIGH (ref 70–99)
GLUCOSE BLDC GLUCOMTR-MCNC: 238 MG/DL — HIGH (ref 70–99)
GLUCOSE BLDC GLUCOMTR-MCNC: 312 MG/DL — HIGH (ref 70–99)
GLUCOSE SERPL-MCNC: 302 MG/DL — HIGH (ref 70–99)
HCT VFR BLD CALC: 37.7 % — LOW (ref 39–50)
HCV AB S/CO SERPL IA: 0.18 S/CO — SIGNIFICANT CHANGE UP (ref 0–0.99)
HCV AB SERPL-IMP: SIGNIFICANT CHANGE UP
HGB BLD-MCNC: 12.2 G/DL — LOW (ref 13–17)
MAGNESIUM SERPL-MCNC: 2.3 MG/DL — SIGNIFICANT CHANGE UP (ref 1.6–2.6)
MCHC RBC-ENTMCNC: 29 PG — SIGNIFICANT CHANGE UP (ref 27–34)
MCHC RBC-ENTMCNC: 32.4 GM/DL — SIGNIFICANT CHANGE UP (ref 32–36)
MCV RBC AUTO: 89.5 FL — SIGNIFICANT CHANGE UP (ref 80–100)
NRBC # BLD: 0 /100 WBCS — SIGNIFICANT CHANGE UP
NRBC # FLD: 0 K/UL — SIGNIFICANT CHANGE UP
PHOSPHATE SERPL-MCNC: 2.8 MG/DL — SIGNIFICANT CHANGE UP (ref 2.5–4.5)
PLATELET # BLD AUTO: 230 K/UL — SIGNIFICANT CHANGE UP (ref 150–400)
POTASSIUM SERPL-MCNC: 4.8 MMOL/L — SIGNIFICANT CHANGE UP (ref 3.5–5.3)
POTASSIUM SERPL-SCNC: 4.8 MMOL/L — SIGNIFICANT CHANGE UP (ref 3.5–5.3)
PROT SERPL-MCNC: 6.1 G/DL — SIGNIFICANT CHANGE UP (ref 6–8.3)
RBC # BLD: 4.21 M/UL — SIGNIFICANT CHANGE UP (ref 4.2–5.8)
RBC # FLD: 12.6 % — SIGNIFICANT CHANGE UP (ref 10.3–14.5)
SARS-COV-2 RNA SPEC QL NAA+PROBE: SIGNIFICANT CHANGE UP
SODIUM SERPL-SCNC: 133 MMOL/L — LOW (ref 135–145)
WBC # BLD: 7.81 K/UL — SIGNIFICANT CHANGE UP (ref 3.8–10.5)
WBC # FLD AUTO: 7.81 K/UL — SIGNIFICANT CHANGE UP (ref 3.8–10.5)

## 2021-12-04 PROCEDURE — 99222 1ST HOSP IP/OBS MODERATE 55: CPT

## 2021-12-04 PROCEDURE — 99231 SBSQ HOSP IP/OBS SF/LOW 25: CPT

## 2021-12-04 RX ORDER — INSULIN LISPRO 100/ML
VIAL (ML) SUBCUTANEOUS AT BEDTIME
Refills: 0 | Status: DISCONTINUED | OUTPATIENT
Start: 2021-12-04 | End: 2021-12-04

## 2021-12-04 RX ORDER — IBUPROFEN 200 MG
400 TABLET ORAL EVERY 6 HOURS
Refills: 0 | Status: DISCONTINUED | OUTPATIENT
Start: 2021-12-04 | End: 2021-12-05

## 2021-12-04 RX ORDER — INSULIN LISPRO 100/ML
4 VIAL (ML) SUBCUTANEOUS ONCE
Refills: 0 | Status: COMPLETED | OUTPATIENT
Start: 2021-12-04 | End: 2021-12-04

## 2021-12-04 RX ORDER — INSULIN LISPRO 100/ML
VIAL (ML) SUBCUTANEOUS
Refills: 0 | Status: DISCONTINUED | OUTPATIENT
Start: 2021-12-04 | End: 2021-12-04

## 2021-12-04 RX ORDER — INSULIN GLARGINE 100 [IU]/ML
24 INJECTION, SOLUTION SUBCUTANEOUS AT BEDTIME
Refills: 0 | Status: DISCONTINUED | OUTPATIENT
Start: 2021-12-04 | End: 2021-12-05

## 2021-12-04 RX ORDER — INSULIN LISPRO 100/ML
8 VIAL (ML) SUBCUTANEOUS
Refills: 0 | Status: DISCONTINUED | OUTPATIENT
Start: 2021-12-04 | End: 2021-12-05

## 2021-12-04 RX ORDER — INSULIN LISPRO 100/ML
VIAL (ML) SUBCUTANEOUS
Refills: 0 | Status: DISCONTINUED | OUTPATIENT
Start: 2021-12-04 | End: 2021-12-05

## 2021-12-04 RX ORDER — INSULIN LISPRO 100/ML
VIAL (ML) SUBCUTANEOUS AT BEDTIME
Refills: 0 | Status: DISCONTINUED | OUTPATIENT
Start: 2021-12-04 | End: 2021-12-05

## 2021-12-04 RX ADMIN — PIPERACILLIN AND TAZOBACTAM 25 GRAM(S): 4; .5 INJECTION, POWDER, LYOPHILIZED, FOR SOLUTION INTRAVENOUS at 06:22

## 2021-12-04 RX ADMIN — Medication 975 MILLIGRAM(S): at 23:56

## 2021-12-04 RX ADMIN — GABAPENTIN 300 MILLIGRAM(S): 400 CAPSULE ORAL at 21:47

## 2021-12-04 RX ADMIN — Medication 975 MILLIGRAM(S): at 17:01

## 2021-12-04 RX ADMIN — INSULIN GLARGINE 24 UNIT(S): 100 INJECTION, SOLUTION SUBCUTANEOUS at 21:47

## 2021-12-04 RX ADMIN — Medication 400 MILLIGRAM(S): at 17:01

## 2021-12-04 RX ADMIN — Medication 975 MILLIGRAM(S): at 17:31

## 2021-12-04 RX ADMIN — PIPERACILLIN AND TAZOBACTAM 25 GRAM(S): 4; .5 INJECTION, POWDER, LYOPHILIZED, FOR SOLUTION INTRAVENOUS at 21:48

## 2021-12-04 RX ADMIN — Medication 400 MILLIGRAM(S): at 23:56

## 2021-12-04 RX ADMIN — Medication 975 MILLIGRAM(S): at 06:21

## 2021-12-04 RX ADMIN — Medication 400 MILLIGRAM(S): at 23:27

## 2021-12-04 RX ADMIN — Medication 400 MILLIGRAM(S): at 17:31

## 2021-12-04 RX ADMIN — Medication 975 MILLIGRAM(S): at 12:18

## 2021-12-04 RX ADMIN — Medication 8 UNIT(S): at 18:37

## 2021-12-04 RX ADMIN — Medication 15 MILLIGRAM(S): at 06:21

## 2021-12-04 RX ADMIN — DEXTROSE MONOHYDRATE, SODIUM CHLORIDE, AND POTASSIUM CHLORIDE 100 MILLILITER(S): 50; .745; 4.5 INJECTION, SOLUTION INTRAVENOUS at 06:21

## 2021-12-04 RX ADMIN — Medication 2: at 18:37

## 2021-12-04 RX ADMIN — ENOXAPARIN SODIUM 40 MILLIGRAM(S): 100 INJECTION SUBCUTANEOUS at 18:38

## 2021-12-04 RX ADMIN — Medication 15 MILLIGRAM(S): at 11:47

## 2021-12-04 RX ADMIN — Medication 4 UNIT(S): at 08:43

## 2021-12-04 RX ADMIN — Medication 4: at 08:22

## 2021-12-04 RX ADMIN — GABAPENTIN 300 MILLIGRAM(S): 400 CAPSULE ORAL at 06:21

## 2021-12-04 RX ADMIN — Medication 975 MILLIGRAM(S): at 23:26

## 2021-12-04 RX ADMIN — OXYCODONE HYDROCHLORIDE 5 MILLIGRAM(S): 5 TABLET ORAL at 12:51

## 2021-12-04 RX ADMIN — Medication 975 MILLIGRAM(S): at 11:48

## 2021-12-04 RX ADMIN — OXYCODONE HYDROCHLORIDE 5 MILLIGRAM(S): 5 TABLET ORAL at 13:21

## 2021-12-04 RX ADMIN — Medication 15 MILLIGRAM(S): at 12:18

## 2021-12-04 RX ADMIN — PIPERACILLIN AND TAZOBACTAM 25 GRAM(S): 4; .5 INJECTION, POWDER, LYOPHILIZED, FOR SOLUTION INTRAVENOUS at 14:02

## 2021-12-04 RX ADMIN — GABAPENTIN 300 MILLIGRAM(S): 400 CAPSULE ORAL at 14:04

## 2021-12-04 NOTE — PROGRESS NOTE ADULT - SUBJECTIVE AND OBJECTIVE BOX
24h Events:  No acute events overnight.    Subjective:   Patient seen at bedside this AM.     Objective:  Vital Signs  T(C): 36.6 (12-04 @ 05:44), Max: 36.8 (12-03 @ 10:14)  HR: 69 (12-04 @ 05:44) (69 - 89)  BP: 110/70 (12-04 @ 05:44) (103/63 - 127/77)  RR: 16 (12-04 @ 05:44) (16 - 18)  SpO2: 98% (12-04 @ 05:44) (98% - 100%)  12-03-21 @ 07:01  -  12-04-21 @ 05:58  --------------------------------------------------------  IN:  Total IN: 0 mL    OUT:    Drain (mL): 100 mL    Voided (mL): 860 mL  Total OUT: 960 mL    Total NET: -960 mL          Physical Exam:  General: NAD, Lying in bed   Neuro: Awake and alert  ABD: soft, ND, TTP RLQ/LLQ    Labs:                        13.7   22.08 )-----------( 268      ( 03 Dec 2021 08:12 )             40.9   12-03    134<L>  |  98  |  12  ----------------------------<  135<H>  4.4   |  27  |  0.96    Ca    8.8      03 Dec 2021 08:12  Phos  3.2     12-03  Mg     2.10     12-03    TPro  6.4  /  Alb  3.2<L>  /  TBili  0.6  /  DBili  x   /  AST  9   /  ALT  7   /  AlkPhos  104  12-02    CAPILLARY BLOOD GLUCOSE      POCT Blood Glucose.: 297 mg/dL (03 Dec 2021 22:18)  POCT Blood Glucose.: 245 mg/dL (03 Dec 2021 16:57)  POCT Blood Glucose.: 96 mg/dL (03 Dec 2021 12:49)  POCT Blood Glucose.: 139 mg/dL (03 Dec 2021 07:40)      Imaging:     24h Events:  No acute events overnight.    Subjective:   Patient seen at bedside this AM. Pt still having some pain but denies n/v.    Objective:  Vital Signs  T(C): 36.6 (12-04 @ 05:44), Max: 36.8 (12-03 @ 10:14)  HR: 69 (12-04 @ 05:44) (69 - 89)  BP: 110/70 (12-04 @ 05:44) (103/63 - 127/77)  RR: 16 (12-04 @ 05:44) (16 - 18)  SpO2: 98% (12-04 @ 05:44) (98% - 100%)  12-03-21 @ 07:01  -  12-04-21 @ 05:58  --------------------------------------------------------  IN:  Total IN: 0 mL    OUT:    Drain (mL): 100 mL    Voided (mL): 860 mL  Total OUT: 960 mL    Total NET: -960 mL        Physical Exam:  General: NAD, Lying in bed   Neuro: Awake and alert  ABD: soft, nondistended, mildly TTP in RUQ/epigastrium. Drain serosanguinous    Labs:                        13.7   22.08 )-----------( 268      ( 03 Dec 2021 08:12 )             40.9   12-03    134<L>  |  98  |  12  ----------------------------<  135<H>  4.4   |  27  |  0.96    Ca    8.8      03 Dec 2021 08:12  Phos  3.2     12-03  Mg     2.10     12-03    TPro  6.4  /  Alb  3.2<L>  /  TBili  0.6  /  DBili  x   /  AST  9   /  ALT  7   /  AlkPhos  104  12-02    CAPILLARY BLOOD GLUCOSE      POCT Blood Glucose.: 297 mg/dL (03 Dec 2021 22:18)  POCT Blood Glucose.: 245 mg/dL (03 Dec 2021 16:57)  POCT Blood Glucose.: 96 mg/dL (03 Dec 2021 12:49)  POCT Blood Glucose.: 139 mg/dL (03 Dec 2021 07:40)      Imaging:

## 2021-12-04 NOTE — CONSULT NOTE ADULT - SUBJECTIVE AND OBJECTIVE BOX
HPI:  60 yo M with uncontrolled T2DM presenting with abdominal pain, found to have perforated appendicitis with large abscess s/p IR drainage on 12/3/21, now ordered for diet. Endocrine was consulted to T2DM management.    Patient was diagnosed with *** in ***. Has been on insulin for *** years. Has been hospitalized for hyperglycemia/DKA before.   Diabetes complications include:  Reports family history of DM in ***.    wt 76 kg    Denies blurry vision, polyuria, polydipsia, and paresthesias.    Endocrinologist:    Last HbA1c: 9.1%    Home DM regimen:    Inpatient DM regimen:  MDCS only  no basal insulin yet    PAST MEDICAL & SURGICAL HISTORY:  H/O insulin dependent diabetes mellitus    History of laparoscopic cholecystectomy        FAMILY HISTORY:      Social History:  Tobacco:  ETOH:  Drug use:    Outpatient Medications:    MEDICATIONS  (STANDING):  acetaminophen     Tablet .. 975 milliGRAM(s) Oral every 6 hours  dextrose 5% + sodium chloride 0.45% with potassium chloride 20 mEq/L 1000 milliLiter(s) (100 mL/Hr) IV Continuous <Continuous>  enoxaparin Injectable 40 milliGRAM(s) SubCutaneous every 24 hours  gabapentin 300 milliGRAM(s) Oral three times a day  insulin lispro (ADMELOG) corrective regimen sliding scale   SubCutaneous three times a day before meals  insulin lispro (ADMELOG) corrective regimen sliding scale   SubCutaneous at bedtime  ketorolac   Injectable 15 milliGRAM(s) IV Push every 6 hours  piperacillin/tazobactam IVPB.. 3.375 Gram(s) IV Intermittent every 8 hours    MEDICATIONS  (PRN):  oxyCODONE    IR 5 milliGRAM(s) Oral every 4 hours PRN Moderate Pain (4 - 6)      Allergies    No Known Allergies    Intolerances        Review of Systems:  Constitutional: No fever, good appetite/po intake  Eyes: No blurry vision, diplopia  Neuro: No tremors  HEENT: No pain  Cardiovascular: No chest pain, palpitations  Respiratory: No SOB, no cough  GI: No nausea, vomiting,   : No dysuria, hematuria  Skin: no rash  Psych: no depression  Endocrine: no polyuria, polydipsia  Hem/lymph: no swelling  Osteoporosis: no fractures    ALL OTHER SYSTEMS REVIEWED AND NEGATIVE    PHYSICAL EXAM:  VITALS: T(C): 36.6 (12-04-21 @ 05:44)  T(F): 97.9 (12-04-21 @ 05:44), Max: 98.3 (12-03-21 @ 10:14)  HR: 69 (12-04-21 @ 05:44) (69 - 89)  BP: 110/70 (12-04-21 @ 05:44) (103/63 - 127/77)  RR:  (16 - 18)  SpO2:  (98% - 100%)  Wt(kg): --  GENERAL: NAD, well-groomed, well-developed  EYES: No proptosis, extraocular movements intact,  no lid lag, anicteric  HEENT:  Atraumatic, Normocephalic, moist mucous membranes  THYROID: Normal size, no palpable nodules, no thyromegaly  RESPIRATORY: Clear to auscultation bilaterally; No rales, rhonchi, wheezing, or rubs  CARDIOVASCULAR: Regular rate and rhythm; No murmurs; no peripheral edema  GI: Soft, nontender, non distended, normal bowel sounds  SKIN: Dry, intact, No rashes or lesions  EXTREMITIES: No foot ulcers, distal pedal pulses intact bilaterally  NEURO: sensation intact, no tremors  PSYCH: reactive affect, euthymic mood  CUSHING'S SIGNS: no striae or visible bruising                              12.2   7.81  )-----------( 230      ( 04 Dec 2021 08:21 )             37.7       12-03    134<L>  |  98  |  12  ----------------------------<  135<H>  4.4   |  27  |  0.96    EGFR if : 100  EGFR if non : 86    Ca    8.8      12-03  Mg     2.10     12-03  Phos  3.2     12-03    TPro  6.4  /  Alb  3.2<L>  /  TBili  0.6  /  DBili  x   /  AST  9   /  ALT  7   /  AlkPhos  104  12-02      A/P: 60 yo M with uncontrolled T2DM presenting with abdominal pain, found to have perforated appendicitis with large abscess s/p IR drainage on 12/3/21, now ordered for diet. Endocrine was consulted to T2DM management.    #Uncontrolled Type 2 Diabetes Mellitus  - Recommend [ ] units of lantus QHS  - Recommend [ ] units of lispro TIDQAC  - Recommend low vs. moderate lispro correction scale TIDQAC and QHS  - Please check FSG before meals and QHS, or q6h while NPO  - inpatient glycemic goal 100-180  - RD consult    D/c planning      #Hypertension  - Continue ***  - Management as per primary team    #Hyperlipidemia  - check fasting lipid panel  - statin:    Loraine Braswell MD  Attending Physician   Department of Endocrinology, Diabetes and Metabolism   Pager: [phone]    If before 9AM or after 5PM, or on weekends/holidays, please call the Endocrine answering service for assistance ([phone]).  For nonurgent matters, please email [email or web address] for assistance.          HPI:  60 yo M with uncontrolled T2DM presenting with abdominal pain, found to have perforated appendicitis with large abscess s/p IR drainage on 12/3/21, now ordered for diet. Endocrine was consulted to T2DM management.    Patient was diagnosed with T2DM about 7 years ago. Has been on insulin for about a year. He follows with PCP. Denies family history of T2DM. He reports good compliance with basal-bolus regimen. Has neuropathy. Has not been to the eye doctor and does not have prior retinopathy history. Denies blurry vision, polyuria, polydipsia, or current paresthesias.    wt 76 kg    Endocrinologist: none    Last HbA1c: 9.1%    Home DM regimen:  basaglar 30 units  lispro 12 units with meals    Inpatient DM regimen:  MDCS only  no basal insulin yet    PAST MEDICAL & SURGICAL HISTORY:  H/O insulin dependent diabetes mellitus    History of laparoscopic cholecystectomy    FAMILY HISTORY: no DM    Social History: no smoking, etoh or drugs    MEDICATIONS  (STANDING):  acetaminophen     Tablet .. 975 milliGRAM(s) Oral every 6 hours  dextrose 5% + sodium chloride 0.45% with potassium chloride 20 mEq/L 1000 milliLiter(s) (100 mL/Hr) IV Continuous <Continuous>  enoxaparin Injectable 40 milliGRAM(s) SubCutaneous every 24 hours  gabapentin 300 milliGRAM(s) Oral three times a day  insulin lispro (ADMELOG) corrective regimen sliding scale   SubCutaneous three times a day before meals  insulin lispro (ADMELOG) corrective regimen sliding scale   SubCutaneous at bedtime  ketorolac   Injectable 15 milliGRAM(s) IV Push every 6 hours  piperacillin/tazobactam IVPB.. 3.375 Gram(s) IV Intermittent every 8 hours    MEDICATIONS  (PRN):  oxyCODONE    IR 5 milliGRAM(s) Oral every 4 hours PRN Moderate Pain (4 - 6)      Allergies    No Known Allergies    Intolerances        Review of Systems:  Constitutional: No fever, good appetite/po intake  Eyes: No blurry vision, diplopia  Neuro: No tremors  HEENT: No pain  Cardiovascular: No chest pain, palpitations  Respiratory: No SOB, no cough  GI: No nausea, vomiting,   : No dysuria, hematuria  Skin: no rash  Psych: no depression  Endocrine: no polyuria, polydipsia  Hem/lymph: no swelling  Osteoporosis: no fractures    ALL OTHER SYSTEMS REVIEWED AND NEGATIVE    PHYSICAL EXAM:  VITALS: T(C): 36.6 (12-04-21 @ 05:44)  T(F): 97.9 (12-04-21 @ 05:44), Max: 98.3 (12-03-21 @ 10:14)  HR: 69 (12-04-21 @ 05:44) (69 - 89)  BP: 110/70 (12-04-21 @ 05:44) (103/63 - 127/77)  RR:  (16 - 18)  SpO2:  (98% - 100%)  Wt(kg): --  GENERAL: NAD, well-groomed, well-developed  EYES: No proptosis, extraocular movements intact,  no lid lag, anicteric  HEENT:  Atraumatic, Normocephalic, moist mucous membranes  THYROID: Normal size, no palpable nodules, no thyromegaly  RESPIRATORY: Clear to auscultation bilaterally; No rales, rhonchi, wheezing, or rubs  CARDIOVASCULAR: Regular rate and rhythm; No murmurs; no peripheral edema  GI: Soft, nontender, non distended, normal bowel sounds  SKIN: Dry, intact, No rashes or lesions  EXTREMITIES: No foot ulcers, distal pedal pulses intact bilaterally; long nails b/l  NEURO: sensation intact, no tremors  PSYCH: reactive affect, euthymic mood  CUSHING'S SIGNS: no striae or visible bruising                              12.2   7.81  )-----------( 230      ( 04 Dec 2021 08:21 )             37.7       12-03    134<L>  |  98  |  12  ----------------------------<  135<H>  4.4   |  27  |  0.96    EGFR if : 100  EGFR if non : 86    Ca    8.8      12-03  Mg     2.10     12-03  Phos  3.2     12-03    TPro  6.4  /  Alb  3.2<L>  /  TBili  0.6  /  DBili  x   /  AST  9   /  ALT  7   /  AlkPhos  104  12-02

## 2021-12-04 NOTE — CHART NOTE - NSCHARTNOTEFT_GEN_A_CORE
Pt made aware of COVID exposure. Repeat swab pending in lab, however was informed by infection control that pt resulted positive.    Pt is aware of results and droplet precautions.    B Team Surgery  [id number] Pt made aware of COVID exposure. Repeat swab pending in lab, however was informed by infection control that pt would be on precautions until next resulted negative.    Pt is aware of results and droplet precautions.    B Team Surgery  [id number]

## 2021-12-04 NOTE — PROGRESS NOTE ADULT - ASSESSMENT
61M with IDDM and history of laparoscopic cholecystectomy presented with 3 weeks of worsening abdominal pain found to have likely perforated appendicitis with large abscess (10 x 5 cm), febrile with tachycardia in ER but normotensive, exam with local peritonitis over right lower quadrant with guarding.     PLAN:  NPO, IVF  F/u IR Recs  PRN Pain control  OOBAT    B Team Surgery  [id number] 61M with IDDM and history of laparoscopic cholecystectomy presented with 3 weeks of worsening abdominal pain found to have likely perforated appendicitis with large abscess (10 x 5 cm), now s/p IR drainage 12/3 with return of 80cc pus, drain in place.    PLAN:  - RLQ abscess: drain in place, monitor output. WBC downtrending (22 -> 7.8), continue to monitor  - Drain culture with preliminary growth of gram+ cocci and gram indeterminate rods. F/u final growth  - C/w Zosyn for abx  - Hyperglycemia: ISS adjusted to moderate. A1c 9.1%, consult Endocrine for input regarding diabetes and inpatient hyperglycemia  - Regular diet    B Team Surgery  [id number]

## 2021-12-04 NOTE — CONSULT NOTE ADULT - ASSESSMENT
A/P: 62 yo M with uncontrolled T2DM presenting with abdominal pain, found to have perforated appendicitis with large abscess s/p IR drainage on 12/3/21, now ordered for diet. Endocrine was consulted to T2DM management.    #Uncontrolled Type 2 Diabetes Mellitus  - A1c 9.1%; home regimen: basaglar 30 units, lispro 12 units with meals  - Recommend lantus 24 units QHS  - Recommend lispro 8 units with meals  - Recommend low dose lispro correction scale TIDQAC and QHS  - Please check FSG before meals and QHS, or q6h while NPO  - inpatient glycemic goal 100-180  - RD consult    D/c planning  - pt will be d/c'ed on basal-bolus regimen, doses TBD  - pt would like to follow up with Endo. Will request appt at [facility]    [facility]  Endocrinology Clinic  Tuesdays 9:20am-12:20pm  [address] [place] [address]  [phone]    #Hypertension  - goal bp <130/80  - Management as per primary team    #Hyperlipidemia  - check fasting lipid panel  - not on statin - pt qualifies for moderate intensity statin in setting of DM if no contraindications    Insulin orders placed    Loraine Braswell MD  Attending Physician   Department of Endocrinology, Diabetes and Metabolism   Pager: [phone]    If before 9AM or after 5PM, or on weekends/holidays, please call the Endocrine answering service for assistance ([phone]).  For nonurgent matters, please email [email or web address] for assistance.

## 2021-12-05 ENCOUNTER — TRANSCRIPTION ENCOUNTER (OUTPATIENT)
Age: 61
End: 2021-12-05

## 2021-12-05 VITALS
DIASTOLIC BLOOD PRESSURE: 76 MMHG | SYSTOLIC BLOOD PRESSURE: 140 MMHG | TEMPERATURE: 98 F | RESPIRATION RATE: 18 BRPM | OXYGEN SATURATION: 100 % | HEART RATE: 71 BPM

## 2021-12-05 LAB
-  AMIKACIN: SIGNIFICANT CHANGE UP
-  AMOXICILLIN/CLAVULANIC ACID: SIGNIFICANT CHANGE UP
-  AMPICILLIN/SULBACTAM: SIGNIFICANT CHANGE UP
-  AMPICILLIN: SIGNIFICANT CHANGE UP
-  AZTREONAM: SIGNIFICANT CHANGE UP
-  CEFAZOLIN: SIGNIFICANT CHANGE UP
-  CEFEPIME: SIGNIFICANT CHANGE UP
-  CEFOXITIN: SIGNIFICANT CHANGE UP
-  CEFTRIAXONE: SIGNIFICANT CHANGE UP
-  CIPROFLOXACIN: SIGNIFICANT CHANGE UP
-  ERTAPENEM: SIGNIFICANT CHANGE UP
-  GENTAMICIN: SIGNIFICANT CHANGE UP
-  IMIPENEM: SIGNIFICANT CHANGE UP
-  LEVOFLOXACIN: SIGNIFICANT CHANGE UP
-  MEROPENEM: SIGNIFICANT CHANGE UP
-  PIPERACILLIN/TAZOBACTAM: SIGNIFICANT CHANGE UP
-  TOBRAMYCIN: SIGNIFICANT CHANGE UP
-  TRIMETHOPRIM/SULFAMETHOXAZOLE: SIGNIFICANT CHANGE UP
A1C WITH ESTIMATED AVERAGE GLUCOSE RESULT: 9.1 % — HIGH (ref 4–5.6)
ANION GAP SERPL CALC-SCNC: 8 MMOL/L — SIGNIFICANT CHANGE UP (ref 7–14)
BUN SERPL-MCNC: 16 MG/DL — SIGNIFICANT CHANGE UP (ref 7–23)
CALCIUM SERPL-MCNC: 8.6 MG/DL — SIGNIFICANT CHANGE UP (ref 8.4–10.5)
CHLORIDE SERPL-SCNC: 101 MMOL/L — SIGNIFICANT CHANGE UP (ref 98–107)
CO2 SERPL-SCNC: 25 MMOL/L — SIGNIFICANT CHANGE UP (ref 22–31)
CREAT SERPL-MCNC: 0.83 MG/DL — SIGNIFICANT CHANGE UP (ref 0.5–1.3)
ESTIMATED AVERAGE GLUCOSE: 214 — SIGNIFICANT CHANGE UP
GLUCOSE BLDC GLUCOMTR-MCNC: 170 MG/DL — HIGH (ref 70–99)
GLUCOSE BLDC GLUCOMTR-MCNC: 202 MG/DL — HIGH (ref 70–99)
GLUCOSE SERPL-MCNC: 226 MG/DL — HIGH (ref 70–99)
HCT VFR BLD CALC: 36.6 % — LOW (ref 39–50)
HGB BLD-MCNC: 11.5 G/DL — LOW (ref 13–17)
MAGNESIUM SERPL-MCNC: 2 MG/DL — SIGNIFICANT CHANGE UP (ref 1.6–2.6)
MCHC RBC-ENTMCNC: 27.9 PG — SIGNIFICANT CHANGE UP (ref 27–34)
MCHC RBC-ENTMCNC: 31.4 GM/DL — LOW (ref 32–36)
MCV RBC AUTO: 88.8 FL — SIGNIFICANT CHANGE UP (ref 80–100)
METHOD TYPE: SIGNIFICANT CHANGE UP
NRBC # BLD: 0 /100 WBCS — SIGNIFICANT CHANGE UP
NRBC # FLD: 0 K/UL — SIGNIFICANT CHANGE UP
PHOSPHATE SERPL-MCNC: 2.4 MG/DL — LOW (ref 2.5–4.5)
PLATELET # BLD AUTO: 264 K/UL — SIGNIFICANT CHANGE UP (ref 150–400)
POTASSIUM SERPL-MCNC: 4.5 MMOL/L — SIGNIFICANT CHANGE UP (ref 3.5–5.3)
POTASSIUM SERPL-SCNC: 4.5 MMOL/L — SIGNIFICANT CHANGE UP (ref 3.5–5.3)
RBC # BLD: 4.12 M/UL — LOW (ref 4.2–5.8)
RBC # FLD: 12.7 % — SIGNIFICANT CHANGE UP (ref 10.3–14.5)
SODIUM SERPL-SCNC: 134 MMOL/L — LOW (ref 135–145)
WBC # BLD: 7.01 K/UL — SIGNIFICANT CHANGE UP (ref 3.8–10.5)
WBC # FLD AUTO: 7.01 K/UL — SIGNIFICANT CHANGE UP (ref 3.8–10.5)

## 2021-12-05 PROCEDURE — 99222 1ST HOSP IP/OBS MODERATE 55: CPT

## 2021-12-05 PROCEDURE — 99231 SBSQ HOSP IP/OBS SF/LOW 25: CPT | Mod: GC

## 2021-12-05 RX ORDER — SODIUM,POTASSIUM PHOSPHATES 278-250MG
1 POWDER IN PACKET (EA) ORAL ONCE
Refills: 0 | Status: COMPLETED | OUTPATIENT
Start: 2021-12-05 | End: 2021-12-05

## 2021-12-05 RX ADMIN — PIPERACILLIN AND TAZOBACTAM 25 GRAM(S): 4; .5 INJECTION, POWDER, LYOPHILIZED, FOR SOLUTION INTRAVENOUS at 06:30

## 2021-12-05 RX ADMIN — Medication 8 UNIT(S): at 12:36

## 2021-12-05 RX ADMIN — Medication 8 UNIT(S): at 08:52

## 2021-12-05 RX ADMIN — Medication 400 MILLIGRAM(S): at 07:00

## 2021-12-05 RX ADMIN — Medication 1 PACKET(S): at 12:41

## 2021-12-05 RX ADMIN — Medication 975 MILLIGRAM(S): at 12:51

## 2021-12-05 RX ADMIN — Medication 975 MILLIGRAM(S): at 06:30

## 2021-12-05 RX ADMIN — GABAPENTIN 300 MILLIGRAM(S): 400 CAPSULE ORAL at 07:24

## 2021-12-05 RX ADMIN — Medication 2: at 08:53

## 2021-12-05 RX ADMIN — Medication 400 MILLIGRAM(S): at 12:41

## 2021-12-05 RX ADMIN — Medication 400 MILLIGRAM(S): at 06:30

## 2021-12-05 RX ADMIN — Medication 975 MILLIGRAM(S): at 07:00

## 2021-12-05 RX ADMIN — Medication 1: at 12:36

## 2021-12-05 RX ADMIN — Medication 975 MILLIGRAM(S): at 12:37

## 2021-12-05 RX ADMIN — Medication 400 MILLIGRAM(S): at 12:51

## 2021-12-05 RX ADMIN — GABAPENTIN 300 MILLIGRAM(S): 400 CAPSULE ORAL at 12:38

## 2021-12-05 NOTE — CONSULT NOTE ADULT - SUBJECTIVE AND OBJECTIVE BOX
Patient is a 61y old  Male who presents with a chief complaint of Abdominal abscess (05 Dec 2021 00:18)    HPI:  is a 61 year old gentleman with PMH of DM, laparoscopic cholecystectomy now presented on 12/3 with 3 weeks of worsening abdominal pain. He came to ER today because the pain has been getting more severe. He also reports anorexia. His last bowel movement was 1 week ago but passing flatus. CT a/p with IV con showed A 10.3 cm  by 5.4 cm large abscess in the region of the terminal ileum/cecum suspicious for sequelae of perforated appendicitis. Pt was started on zosyn and s/p RLQ abdominal abscess drainage on 12/3 when 80cc pus aspirated and sent for cultures. Abscess cx is now growing E.Coli and ID consulted for the same.        REVIEW OF SYSTEMS  [  ] ROS unobtainable because:    [ x ] All other systems negative except as noted below    Constitutional:  [ ] fever [ ] chills  [ ] weight loss  [ ]night sweat  [ ]poor appetite/PO intake [ ]fatigue   Skin:  [ ] rash [ ] phlebitis	  Eyes: [ ] icterus [ ] pain  [ ] discharge	  ENMT: [ ] sore throat  [ ] thrush [ ] ulcers [ ] exudates [ ]anosmia  Respiratory: [ ] dyspnea [ ] hemoptysis [ ] cough [ ] sputum	  Cardiovascular:  [ ] chest pain [ ] palpitations [ ] edema	  Gastrointestinal:  [ ] nausea [ ] vomiting [ ] diarrhea [ ] constipation [ ] pain	  Genitourinary:  [ ] dysuria [ ] frequency [ ] hematuria [ ] discharge [ ] flank pain  [ ] incontinence  Musculoskeletal:  [ ] myalgias [ ] arthralgias [ ] arthritis  [ ] back pain  Neurological:  [ ] headache [ ] weakness [ ] seizures  [ ] confusion/altered mental status    prior hospital charts reviewed [V]  primary team notes reviewed [V]  other consultant notes reviewed [V]    PAST MEDICAL & SURGICAL HISTORY:  H/O insulin dependent diabetes mellitus    History of laparoscopic cholecystectomy        SOCIAL HISTORY:  - Denied smoking/vaping/alcohol/recreational drug use    FAMILY HISTORY:  Denies family history of T2DM.    Allergies  No Known Allergies        ANTIMICROBIALS:  piperacillin/tazobactam IVPB.. 3.375 every 8 hours      ANTIMICROBIALS (past 90 days):  MEDICATIONS  (STANDING):  piperacillin/tazobactam IVPB..   25 mL/Hr IV Intermittent (12-05-21 @ 06:30)   25 mL/Hr IV Intermittent (12-04-21 @ 21:48)   25 mL/Hr IV Intermittent (12-04-21 @ 14:02)   25 mL/Hr IV Intermittent (12-04-21 @ 06:22)   25 mL/Hr IV Intermittent (12-03-21 @ 21:53)   25 mL/Hr IV Intermittent (12-03-21 @ 15:01)   25 mL/Hr IV Intermittent (12-03-21 @ 07:45)    piperacillin/tazobactam IVPB...   200 mL/Hr IV Intermittent (12-02-21 @ 23:53)        OTHER MEDS:   MEDICATIONS  (STANDING):  acetaminophen     Tablet .. 975 every 6 hours  enoxaparin Injectable 40 every 24 hours  gabapentin 300 three times a day  ibuprofen  Tablet. 400 every 6 hours  insulin glargine Injectable (LANTUS) 24 at bedtime  insulin lispro (ADMELOG) corrective regimen sliding scale  three times a day before meals  insulin lispro (ADMELOG) corrective regimen sliding scale  at bedtime  insulin lispro Injectable (ADMELOG) 8 three times a day before meals      VITALS:  Vital Signs Last 24 Hrs  T(F): 97.6 (12-05-21 @ 06:30), Max: 101 (12-02-21 @ 21:06)    Vital Signs Last 24 Hrs  HR: 71 (12-05-21 @ 06:30) (70 - 77)  BP: 140/76 (12-05-21 @ 06:30) (119/71 - 140/76)  RR: 18 (12-05-21 @ 06:30)  SpO2: 100% (12-05-21 @ 06:30) (98% - 100%)  Wt(kg): --    EXAM:    GA: NAD, AOx3  HEENT: oral cavity no lesion  CV: nl S1/S2, no RMG  Lungs: CTAB, No distress  Abd: BS+, soft, nontender, no rebounding pain  Ext: no edema  Neuro: No focal deficits  Skin: Intact  IV: no phlebitis    Labs:                        12.2   7.81  )-----------( 230      ( 04 Dec 2021 08:21 )             37.7     12-04    133<L>  |  98  |  16  ----------------------------<  302<H>  4.8   |  26  |  1.02    Ca    8.4      04 Dec 2021 08:21  Phos  2.8     12-04  Mg     2.30     12-04    TPro  6.1  /  Alb  2.9<L>  /  TBili  0.2  /  DBili  x   /  AST  8   /  ALT  8   /  AlkPhos  91  12-04      WBC Trend:  WBC Count: 7.81 (12-04-21 @ 08:21)  WBC Count: 22.08 (12-03-21 @ 08:12)  WBC Count: 24.77 (12-02-21 @ 21:48)      Auto Neutrophil #: 21.38 K/uL (12-02-21 @ 21:48)      Creatine Trend:  Creatinine, Serum: 1.02 (12-04)  Creatinine, Serum: 0.96 (12-03)  Creatinine, Serum: 0.96 (12-02)      Liver Biochemical Testing Trend:  Alanine Aminotransferase (ALT/SGPT): 8 (12-04)  Alanine Aminotransferase (ALT/SGPT): 7 (12-02)  Aspartate Aminotransferase (AST/SGOT): 8 (12-04-21 @ 08:21)  Aspartate Aminotransferase (AST/SGOT): 9 (12-02-21 @ 21:48)  Bilirubin Total, Serum: 0.2 (12-04)  Bilirubin Total, Serum: 0.6 (12-02)      Trend LDH      Auto Eosinophil %: 0.1 % (12-02-21 @ 21:48)          MICROBIOLOGY:        Culture - Body Fluid with Gram Stain (collected 03 Dec 2021 16:46)  Source: .Body Fluid ABSCESS  Preliminary Report:    Numerous Escherichia coli  Organism: Escherichia coli  Organism: Escherichia coli    Sensitivities:      -  Amikacin: S <=16      -  Amoxicillin/Clavulanic Acid: S <=8/4      -  Ampicillin: S <=8 These ampicillin results predict results for amoxicillin      -  Ampicillin/Sulbactam: S <=4/2 Enterobacter, Klebsiella aerogenes, Citrobacter, and Serratia may develop resistance during prolonged therapy (3-4 days)      -  Aztreonam: S <=4      -  Cefazolin: S <=2 Enterobacter, Klebsiella aerogenes, Citrobacter, and Serratia may develop resistance during prolonged therapy (3-4 days)      -  Cefepime: S <=2      -  Cefoxitin: S <=8      -  Ceftriaxone: S <=1 Enterobacter, Klebsiella aerogenes, Citrobacter, and Serratia may develop resistance during prolonged therapy      -  Ciprofloxacin: S <=0.25      -  Ertapenem: S <=0.5      -  Gentamicin: S <=2      -  Imipenem: S <=1      -  Levofloxacin: S <=0.5      -  Meropenem: S <=1      -  Piperacillin/Tazobactam: S <=8      -  Tobramycin: S <=2      -  Trimethoprim/Sulfamethoxazole: S <=0.5/9.5      Method Type: OSWALD    Culture - Blood (collected 03 Dec 2021 02:27)  Source: .Blood Blood-Peripheral  Preliminary Report:    No growth to date.    Culture - Blood (collected 03 Dec 2021 02:27)  Source: .Blood Blood-Venous  Preliminary Report:    No growth to date.      HIV-1/2 Combo Result: Nonreact (12-03-21 @ 00:25)                    Rapid RVP Result: NotDetec (12-03 @ 00:54)    COVID-19 PCR: NotDetec (12-04-21 @ 08:21)                      Blood Gas Venous - Lactate: 0.8 (12-03 @ 03:48)  Blood Gas Venous - Lactate: 2.1 (12-03 @ 03:07)  Blood Gas Venous - Lactate: 2.3 (12-02 @ 21:46)    A1C with Estimated Average Glucose Result: 9.1 % (12-04-21 @ 08:21)      CSF:                  RADIOLOGY:  imaging below personally reviewed    EXAM:  CT ABDOMEN AND PELVIS IC    PROCEDURE DATE:  Dec  3 2021   IMPRESSION:   A 10.3 cm  by 5.4 cm large abscess in the region of the terminal ileum/cecum containing gas locules and calcifications, suspicious for sequelae of perforated appendicitis.  --- End of Report ---    < from: Xray Chest 2 Views PA/Lat (12.02.21 @ 21:25) >  Clear lungs.  < end of copied text >     Patient is a 61y old  Male who presents with a chief complaint of Abdominal abscess (05 Dec 2021 00:18)    HPI:  is a 61 year old gentleman with PMH of DM, laparoscopic cholecystectomy now presented on 12/3 with 3 weeks of worsening abdominal pain. He came to ER today because the pain has been getting more severe. He also reports anorexia. His last bowel movement was 1 week ago but passing flatus. CT a/p with IV con showed A 10.3 cm  by 5.4 cm large abscess in the region of the terminal ileum/cecum suspicious for sequelae of perforated appendicitis. Pt was started on zosyn and s/p RLQ abdominal abscess drainage on 12/3 when 80cc pus aspirated and sent for cultures. Abscess cx is now growing E.Coli and ID consulted for the same.        REVIEW OF SYSTEMS  [  ] ROS unobtainable because:    [ x ] All other systems negative except as noted below    Constitutional:  [ ] fever [ ] chills  [ ] weight loss  [ ]night sweat  [ ]poor appetite/PO intake [ ]fatigue   Skin:  [ ] rash [ ] phlebitis	  Eyes: [ ] icterus [ ] pain  [ ] discharge	  ENMT: [ ] sore throat  [ ] thrush [ ] ulcers [ ] exudates [ ]anosmia  Respiratory: [ ] dyspnea [ ] hemoptysis [ ] cough [ ] sputum	  Cardiovascular:  [ ] chest pain [ ] palpitations [ ] edema	  Gastrointestinal:  [ ] nausea [ ] vomiting [ ] diarrhea [ ] constipation [ ] pain	  Genitourinary:  [ ] dysuria [ ] frequency [ ] hematuria [ ] discharge [ ] flank pain  [ ] incontinence  Musculoskeletal:  [ ] myalgias [ ] arthralgias [ ] arthritis  [ ] back pain  Neurological:  [ ] headache [ ] weakness [ ] seizures  [ ] confusion/altered mental status    prior hospital charts reviewed [V]  primary team notes reviewed [V]  other consultant notes reviewed [V]    PAST MEDICAL & SURGICAL HISTORY:  H/O insulin dependent diabetes mellitus    History of laparoscopic cholecystectomy        SOCIAL HISTORY:  - Denied vaping/alcohol/recreational drug use  - 1/2 PPD spoking  - USes crack cocaine  - Lives with father  - No pets    FAMILY HISTORY:  Denies family history of T2DM.    Allergies  No Known Allergies        ANTIMICROBIALS:  piperacillin/tazobactam IVPB.. 3.375 every 8 hours      ANTIMICROBIALS (past 90 days):  MEDICATIONS  (STANDING):  piperacillin/tazobactam IVPB..   25 mL/Hr IV Intermittent (12-05-21 @ 06:30)   25 mL/Hr IV Intermittent (12-04-21 @ 21:48)   25 mL/Hr IV Intermittent (12-04-21 @ 14:02)   25 mL/Hr IV Intermittent (12-04-21 @ 06:22)   25 mL/Hr IV Intermittent (12-03-21 @ 21:53)   25 mL/Hr IV Intermittent (12-03-21 @ 15:01)   25 mL/Hr IV Intermittent (12-03-21 @ 07:45)    piperacillin/tazobactam IVPB...   200 mL/Hr IV Intermittent (12-02-21 @ 23:53)        OTHER MEDS:   MEDICATIONS  (STANDING):  acetaminophen     Tablet .. 975 every 6 hours  enoxaparin Injectable 40 every 24 hours  gabapentin 300 three times a day  ibuprofen  Tablet. 400 every 6 hours  insulin glargine Injectable (LANTUS) 24 at bedtime  insulin lispro (ADMELOG) corrective regimen sliding scale  three times a day before meals  insulin lispro (ADMELOG) corrective regimen sliding scale  at bedtime  insulin lispro Injectable (ADMELOG) 8 three times a day before meals      VITALS:  Vital Signs Last 24 Hrs  T(F): 97.6 (12-05-21 @ 06:30), Max: 101 (12-02-21 @ 21:06)    Vital Signs Last 24 Hrs  HR: 71 (12-05-21 @ 06:30) (70 - 77)  BP: 140/76 (12-05-21 @ 06:30) (119/71 - 140/76)  RR: 18 (12-05-21 @ 06:30)  SpO2: 100% (12-05-21 @ 06:30) (98% - 100%)  Wt(kg): --    EXAM:    PHYSICAL EXAM:  General:  non-toxic, AOx3  HEAD/EYES: PERRL, white sclera   ENT:  normal, No thrush and no pharyngeal exudate  Neck: Supple  Cardiovascular:   No murmur,  normal S1 and S2  Respiratory: clear to ausculation bilaterally  GI:  soft, non-tender, normal bowel sounds, RLQ drain site with some bloody soakage  : no roper, no CVA tenderness   Musculoskeletal:  no synovitis  Neurologic: non-focal exam   Skin: no rash  Lymph:  no lymphadenopathy  Psychiatric: Cooperative, appropriate affect, alert & oriented  Lines:  no phlebitis         Labs:                        12.2   7.81  )-----------( 230      ( 04 Dec 2021 08:21 )             37.7     12-04    133<L>  |  98  |  16  ----------------------------<  302<H>  4.8   |  26  |  1.02    Ca    8.4      04 Dec 2021 08:21  Phos  2.8     12-04  Mg     2.30     12-04    TPro  6.1  /  Alb  2.9<L>  /  TBili  0.2  /  DBili  x   /  AST  8   /  ALT  8   /  AlkPhos  91  12-04      WBC Trend:  WBC Count: 7.81 (12-04-21 @ 08:21)  WBC Count: 22.08 (12-03-21 @ 08:12)  WBC Count: 24.77 (12-02-21 @ 21:48)      Auto Neutrophil #: 21.38 K/uL (12-02-21 @ 21:48)      Creatine Trend:  Creatinine, Serum: 1.02 (12-04)  Creatinine, Serum: 0.96 (12-03)  Creatinine, Serum: 0.96 (12-02)      Liver Biochemical Testing Trend:  Alanine Aminotransferase (ALT/SGPT): 8 (12-04)  Alanine Aminotransferase (ALT/SGPT): 7 (12-02)  Aspartate Aminotransferase (AST/SGOT): 8 (12-04-21 @ 08:21)  Aspartate Aminotransferase (AST/SGOT): 9 (12-02-21 @ 21:48)  Bilirubin Total, Serum: 0.2 (12-04)  Bilirubin Total, Serum: 0.6 (12-02)      Trend LDH      Auto Eosinophil %: 0.1 % (12-02-21 @ 21:48)          MICROBIOLOGY:        Culture - Body Fluid with Gram Stain (collected 03 Dec 2021 16:46)  Source: .Body Fluid ABSCESS  Preliminary Report:    Numerous Escherichia coli  Organism: Escherichia coli  Organism: Escherichia coli    Sensitivities:      -  Amikacin: S <=16      -  Amoxicillin/Clavulanic Acid: S <=8/4      -  Ampicillin: S <=8 These ampicillin results predict results for amoxicillin      -  Ampicillin/Sulbactam: S <=4/2 Enterobacter, Klebsiella aerogenes, Citrobacter, and Serratia may develop resistance during prolonged therapy (3-4 days)      -  Aztreonam: S <=4      -  Cefazolin: S <=2 Enterobacter, Klebsiella aerogenes, Citrobacter, and Serratia may develop resistance during prolonged therapy (3-4 days)      -  Cefepime: S <=2      -  Cefoxitin: S <=8      -  Ceftriaxone: S <=1 Enterobacter, Klebsiella aerogenes, Citrobacter, and Serratia may develop resistance during prolonged therapy      -  Ciprofloxacin: S <=0.25      -  Ertapenem: S <=0.5      -  Gentamicin: S <=2      -  Imipenem: S <=1      -  Levofloxacin: S <=0.5      -  Meropenem: S <=1      -  Piperacillin/Tazobactam: S <=8      -  Tobramycin: S <=2      -  Trimethoprim/Sulfamethoxazole: S <=0.5/9.5      Method Type: OSWALD    Culture - Blood (collected 03 Dec 2021 02:27)  Source: .Blood Blood-Peripheral  Preliminary Report:    No growth to date.    Culture - Blood (collected 03 Dec 2021 02:27)  Source: .Blood Blood-Venous  Preliminary Report:    No growth to date.      HIV-1/2 Combo Result: Nonreact (12-03-21 @ 00:25)                    Rapid RVP Result: NotDetec (12-03 @ 00:54)    COVID-19 PCR: NotDetec (12-04-21 @ 08:21)                      Blood Gas Venous - Lactate: 0.8 (12-03 @ 03:48)  Blood Gas Venous - Lactate: 2.1 (12-03 @ 03:07)  Blood Gas Venous - Lactate: 2.3 (12-02 @ 21:46)    A1C with Estimated Average Glucose Result: 9.1 % (12-04-21 @ 08:21)      CSF:                  RADIOLOGY:  imaging below personally reviewed    EXAM:  CT ABDOMEN AND PELVIS IC    PROCEDURE DATE:  Dec  3 2021   IMPRESSION:   A 10.3 cm  by 5.4 cm large abscess in the region of the terminal ileum/cecum containing gas locules and calcifications, suspicious for sequelae of perforated appendicitis.  --- End of Report ---    < from: Xray Chest 2 Views PA/Lat (12.02.21 @ 21:25) >  Clear lungs.  < end of copied text >

## 2021-12-05 NOTE — PROGRESS NOTE ADULT - ASSESSMENT
61M with IDDM and history of laparoscopic cholecystectomy presented with 3 weeks of worsening abdominal pain found to have likely perforated appendicitis with large abscess (10 x 5 cm), now s/p IR drainage 12/3 with return of 80cc pus, drain in place.    PLAN:  - RLQ abscess: drain in place, WBC downtrending  - Drain culture growing E. coli, pansensitive  - ID consult for PO Abx recommendations  - C/w Zosyn for abx for now  - Hyperglycemia: Lantus 24u, ISS. Appreciate endo recs  - Regular diet    B Team Surgery  [id number]

## 2021-12-05 NOTE — PROGRESS NOTE ADULT - ATTENDING COMMENTS
Pt seen and examined.  Agree with resident eval and plan. Imp: perforated appendicitis with abscess s/p IR drainage.
Pt seen and examined with surgical resident staff.  Agree with assessment and plan. Imp: Perforated appendicitis s/p IR drainage of abdominal abscess.

## 2021-12-05 NOTE — CONSULT NOTE ADULT - ASSESSMENT
is a 61 year old gentleman with PMH of DM, laparoscopic cholecystectomy now presented on 12/3 with 3 weeks of worsening abdominal pain. He came to ER today because the pain has been getting more severe. He also reports anorexia. His last bowel movement was 1 week ago but passing flatus. CT a/p with IV con showed A 10.3 cm  by 5.4 cm large abscess in the region of the terminal ileum/cecum suspicious for sequelae of perforated appendicitis. Pt was started on zosyn and s/p RLQ abdominal abscess drainage on 12/3 when 80cc pus aspirated and sent for cultures. Abscess cx is now growing E.Coli and ID consulted for the same.     CT a/p with IV con (12/3):  A 10.3 cm  by 5.4 cm large abscess in the region of the terminal ileum/cecum containing gas locules and calcifications, suspicious for sequelae of perforated appendicitis.  s/p RLQ abdominal abscess drainage on 12/3 when 80cc pus aspirated and sent for cultures  Abscess Cx (12/3): Escherichia coli (Pan- sensitive)  Blood cx and RVP: negative    IMPRESSION  ·	Intraabdominal Abscess  ·	Perforated appendicitis  ·	Uncontrolled Type 2 Diabetes Mellitus (A1C 9.1)  ·	COVID Exposure on 12/4    RECOMMENDATIONs  Afebrile since 12/2 with downtrending leukocytosis (Now normalised)  Planned for interval appendectomy after resolution.   Currently on zosyn  Abscess stain showed GPC in pairs and gram variable rods -> Cx growing pain sensitive E.Coli so far    Pt to be seen    Marcellus Martinez MD, PGY4   ID fellow  Pager: [phone]  [facility] pager ID: [phone] (would prefer to text page for any new consult or question, please include name/location and best call back number)  After 5pm/weekends call [phone]     is a 61 year old gentleman with PMH of DM, laparoscopic cholecystectomy now presented on 12/3 with 3 weeks of worsening abdominal pain. He came to ER today because the pain has been getting more severe. He also reports anorexia. His last bowel movement was 1 week ago but passing flatus. CT a/p with IV con showed A 10.3 cm  by 5.4 cm large abscess in the region of the terminal ileum/cecum suspicious for sequelae of perforated appendicitis. Pt was started on zosyn and s/p RLQ abdominal abscess drainage on 12/3 when 80cc pus aspirated and sent for cultures. Abscess cx is now growing E.Coli and ID consulted for the same.     CT a/p with IV con (12/3):  A 10.3 cm  by 5.4 cm large abscess in the region of the terminal ileum/cecum containing gas locules and calcifications, suspicious for sequelae of perforated appendicitis.  s/p RLQ abdominal abscess drainage on 12/3 when 80cc pus aspirated and sent for cultures  Abscess Cx (12/3): Escherichia coli (Pan- sensitive)  Blood cx and RVP: negative    IMPRESSION  ·	Intraabdominal Abscess  ·	Perforated appendicitis  ·	Uncontrolled Type 2 Diabetes Mellitus (A1C 9.1)  ·	COVID Exposure on 12/4    RECOMMENDATIONs  Afebrile since 12/2 with downtrending leukocytosis (Now normalised)  Abscess stain showed GPC in pairs and gram variable rods -> Cx growing pain sensitive E.Coli so far  Will f/u final cx  Currently on zosyn  At time of discharge reasonable to change to Augmentin for 14 day course  Will need interval imaging to see abscess resolution  Planned for interval appendectomy after resolution.   Pt had J&J vaccine-> recommend moderna booster after 10 d quarantine    Pt seen and examined. Case d/w attending and paged primary team    Marcellus Martinez MD, PGY4   ID fellow  Pager: [phone]  [facility] pager ID: [phone] (would prefer to text page for any new consult or question, please include name/location and best call back number)  After 5pm/weekends call [phone]

## 2021-12-05 NOTE — CONSULT NOTE ADULT - ATTENDING COMMENTS
61 year old with DM presented with abdominal pain  Found to have a ruptured appendix on imaging     S/p IR drainage of abscess    Cx with E coli- pan sensitive    Reasonable to change to Augmentin for 14 day course    May need interval imaging of the abd with surgery    Close follow up with surgery for evaluation for eventual appendectomy    COVID exposure- isolation for 10 d- can be done at home.  Pt had J and J vaccine, I would recommend moderna booster after 10 d quarantine

## 2021-12-05 NOTE — PROGRESS NOTE ADULT - SUBJECTIVE AND OBJECTIVE BOX
Surgery Progress Note    Subjective:     Patient seen and examined at bedside. Patient without complaints this AM. Denies N/V/F/C.     OBJECTIVE:     T(C): 37 (12-04-21 @ 21:48), Max: 37 (12-04-21 @ 21:48)  HR: 76 (12-04-21 @ 21:48) (69 - 85)  BP: 135/71 (12-04-21 @ 21:48) (110/70 - 135/71)  RR: 18 (12-04-21 @ 21:48) (16 - 18)  SpO2: 100% (12-04-21 @ 21:48) (98% - 100%)  Wt(kg): --    I&O's Detail    03 Dec 2021 07:01  -  04 Dec 2021 07:00  --------------------------------------------------------  IN:    dextrose 5% + sodium chloride 0.45% w/ Additives: 1900 mL    IV PiggyBack: 100 mL    Oral Fluid: 600 mL  Total IN: 2600 mL    OUT:    Drain (mL): 105 mL    Voided (mL): 1210 mL  Total OUT: 1315 mL    Total NET: 1285 mL      04 Dec 2021 07:01  -  05 Dec 2021 00:18  --------------------------------------------------------  IN:    dextrose 5% + sodium chloride 0.45% w/ Additives: 700 mL    IV PiggyBack: 200 mL    Oral Fluid: 870 mL  Total IN: 1770 mL    OUT:    Drain (mL): 15 mL    Voided (mL): 650 mL  Total OUT: 665 mL    Total NET: 1105 mL          PHYSICAL EXAM:    GENERAL: NAD, lying in bed comfortably  HEAD:  Atraumatic, Normocephalic  ENT: Moist mucous membranes  CHEST/LUNG: Unlabored respirations  ABDOMEN: Soft, Nontender, Nondistended.   NERVOUS SYSTEM:  Alert & Oriented X3, speech clear.   SKIN: No rashes or lesions    MEDICATIONS  (STANDING):  acetaminophen     Tablet .. 975 milliGRAM(s) Oral every 6 hours  enoxaparin Injectable 40 milliGRAM(s) SubCutaneous every 24 hours  gabapentin 300 milliGRAM(s) Oral three times a day  ibuprofen  Tablet. 400 milliGRAM(s) Oral every 6 hours  insulin glargine Injectable (LANTUS) 24 Unit(s) SubCutaneous at bedtime  insulin lispro (ADMELOG) corrective regimen sliding scale   SubCutaneous three times a day before meals  insulin lispro (ADMELOG) corrective regimen sliding scale   SubCutaneous at bedtime  insulin lispro Injectable (ADMELOG) 8 Unit(s) SubCutaneous three times a day before meals  piperacillin/tazobactam IVPB.. 3.375 Gram(s) IV Intermittent every 8 hours    MEDICATIONS  (PRN):      LABS:                          12.2   7.81  )-----------( 230      ( 04 Dec 2021 08:21 )             37.7     12-04    133<L>  |  98  |  16  ----------------------------<  302<H>  4.8   |  26  |  1.02    Ca    8.4      04 Dec 2021 08:21  Phos  2.8     12-04  Mg     2.30     12-04    TPro  6.1  /  Alb  2.9<L>  /  TBili  0.2  /  DBili  x   /  AST  8   /  ALT  8   /  AlkPhos  91  12-04    PT/INR - ( 03 Dec 2021 03:48 )   PT: 14.8 sec;   INR: 1.30 ratio         PTT - ( 03 Dec 2021 03:48 )  PTT:37.7 sec           Surgery Progress Note    Subjective:     Patient seen and examined at bedside. Pt sleepy and did not want to talk at this time.      OBJECTIVE:     T(C): 37 (12-04-21 @ 21:48), Max: 37 (12-04-21 @ 21:48)  HR: 76 (12-04-21 @ 21:48) (69 - 85)  BP: 135/71 (12-04-21 @ 21:48) (110/70 - 135/71)  RR: 18 (12-04-21 @ 21:48) (16 - 18)  SpO2: 100% (12-04-21 @ 21:48) (98% - 100%)  Wt(kg): --    I&O's Detail    03 Dec 2021 07:01  -  04 Dec 2021 07:00  --------------------------------------------------------  IN:    dextrose 5% + sodium chloride 0.45% w/ Additives: 1900 mL    IV PiggyBack: 100 mL    Oral Fluid: 600 mL  Total IN: 2600 mL    OUT:    Drain (mL): 105 mL    Voided (mL): 1210 mL  Total OUT: 1315 mL    Total NET: 1285 mL      04 Dec 2021 07:01  -  05 Dec 2021 00:18  --------------------------------------------------------  IN:    dextrose 5% + sodium chloride 0.45% w/ Additives: 700 mL    IV PiggyBack: 200 mL    Oral Fluid: 870 mL  Total IN: 1770 mL    OUT:    Drain (mL): 15 mL    Voided (mL): 650 mL  Total OUT: 665 mL    Total NET: 1105 mL          Physical Exam:  General: NAD, Lying in bed   Neuro: Awake and alert  ABD: soft, nondistended, mildly TTP in RUQ/epigastrium. Drain serosanguinous      LABS:                          12.2   7.81  )-----------( 230      ( 04 Dec 2021 08:21 )             37.7     12-04    133<L>  |  98  |  16  ----------------------------<  302<H>  4.8   |  26  |  1.02    Ca    8.4      04 Dec 2021 08:21  Phos  2.8     12-04  Mg     2.30     12-04    TPro  6.1  /  Alb  2.9<L>  /  TBili  0.2  /  DBili  x   /  AST  8   /  ALT  8   /  AlkPhos  91  12-04    PT/INR - ( 03 Dec 2021 03:48 )   PT: 14.8 sec;   INR: 1.30 ratio         PTT - ( 03 Dec 2021 03:48 )  PTT:37.7 sec

## 2021-12-05 NOTE — DISCHARGE NOTE NURSING/CASE MANAGEMENT/SOCIAL WORK - NSDCPEFALRISK_GEN_ALL_CORE
For information on Fall & Injury Prevention, visit: [email or web address] OR  [email or web address] OR  https://www.cdc.gov/steadi/patient.html

## 2021-12-05 NOTE — DISCHARGE NOTE NURSING/CASE MANAGEMENT/SOCIAL WORK - PATIENT PORTAL LINK FT
You can access the FollowMyHealth Patient Portal offered by [facility] by registering at the following website: [email or web address]. By joining [employer]’s FollowMyHealth portal, you will also be able to view your health information using other applications (apps) compatible with our system.

## 2021-12-07 LAB
-  AMIKACIN: SIGNIFICANT CHANGE UP
-  AMOXICILLIN/CLAVULANIC ACID: SIGNIFICANT CHANGE UP
-  AMPICILLIN/SULBACTAM: SIGNIFICANT CHANGE UP
-  AMPICILLIN: SIGNIFICANT CHANGE UP
-  AZTREONAM: SIGNIFICANT CHANGE UP
-  CEFAZOLIN: SIGNIFICANT CHANGE UP
-  CEFEPIME: SIGNIFICANT CHANGE UP
-  CEFOXITIN: SIGNIFICANT CHANGE UP
-  CEFTRIAXONE: SIGNIFICANT CHANGE UP
-  CIPROFLOXACIN: SIGNIFICANT CHANGE UP
-  ERTAPENEM: SIGNIFICANT CHANGE UP
-  GENTAMICIN: SIGNIFICANT CHANGE UP
-  IMIPENEM: SIGNIFICANT CHANGE UP
-  LEVOFLOXACIN: SIGNIFICANT CHANGE UP
-  MEROPENEM: SIGNIFICANT CHANGE UP
-  PIPERACILLIN/TAZOBACTAM: SIGNIFICANT CHANGE UP
-  TOBRAMYCIN: SIGNIFICANT CHANGE UP
-  TRIMETHOPRIM/SULFAMETHOXAZOLE: SIGNIFICANT CHANGE UP
CULTURE RESULTS: SIGNIFICANT CHANGE UP
METHOD TYPE: SIGNIFICANT CHANGE UP
ORGANISM # SPEC MICROSCOPIC CNT: SIGNIFICANT CHANGE UP
SPECIMEN SOURCE: SIGNIFICANT CHANGE UP

## 2021-12-08 DIAGNOSIS — Z01.818 ENCOUNTER FOR OTHER PREPROCEDURAL EXAMINATION: ICD-10-CM

## 2021-12-08 PROBLEM — Z00.00 ENCOUNTER FOR PREVENTIVE HEALTH EXAMINATION: Status: ACTIVE | Noted: 2021-12-08

## 2021-12-08 PROBLEM — Z86.39 PERSONAL HISTORY OF OTHER ENDOCRINE, NUTRITIONAL AND METABOLIC DISEASE: Chronic | Status: ACTIVE | Noted: 2021-12-03

## 2021-12-08 LAB
CULTURE RESULTS: SIGNIFICANT CHANGE UP
CULTURE RESULTS: SIGNIFICANT CHANGE UP
SPECIMEN SOURCE: SIGNIFICANT CHANGE UP
SPECIMEN SOURCE: SIGNIFICANT CHANGE UP

## 2021-12-13 ENCOUNTER — APPOINTMENT (OUTPATIENT)
Dept: CT IMAGING | Facility: HOSPITAL | Age: 61
End: 2021-12-13

## 2021-12-22 ENCOUNTER — OUTPATIENT (OUTPATIENT)
Dept: OUTPATIENT SERVICES | Facility: HOSPITAL | Age: 61
LOS: 1 days | End: 2021-12-22
Payer: MEDICAID

## 2021-12-22 ENCOUNTER — APPOINTMENT (OUTPATIENT)
Dept: CT IMAGING | Facility: HOSPITAL | Age: 61
End: 2021-12-22

## 2021-12-22 ENCOUNTER — RESULT REVIEW (OUTPATIENT)
Age: 61
End: 2021-12-22

## 2021-12-22 VITALS
SYSTOLIC BLOOD PRESSURE: 132 MMHG | TEMPERATURE: 98 F | HEART RATE: 77 BPM | DIASTOLIC BLOOD PRESSURE: 76 MMHG | OXYGEN SATURATION: 99 % | RESPIRATION RATE: 16 BRPM

## 2021-12-22 DIAGNOSIS — Z90.49 ACQUIRED ABSENCE OF OTHER SPECIFIED PARTS OF DIGESTIVE TRACT: Chronic | ICD-10-CM

## 2021-12-22 DIAGNOSIS — K65.1 PERITONEAL ABSCESS: ICD-10-CM

## 2021-12-22 PROCEDURE — 49424 ASSESS CYST CONTRAST INJECT: CPT

## 2021-12-22 PROCEDURE — 76080 X-RAY EXAM OF FISTULA: CPT | Mod: 26

## 2021-12-22 PROCEDURE — 74176 CT ABD & PELVIS W/O CONTRAST: CPT

## 2021-12-22 PROCEDURE — 76080 X-RAY EXAM OF FISTULA: CPT

## 2021-12-22 PROCEDURE — 74176 CT ABD & PELVIS W/O CONTRAST: CPT | Mod: 26

## 2021-12-22 NOTE — PROCEDURE NOTE - PROCEDURE FINDINGS AND DETAILS
LLQ abscess drain evaluation performed. Small amorphic cavity with communication to bowel noted. Drain left in place. images reviewed with Dr. Stewart

## 2021-12-22 NOTE — ASU PATIENT PROFILE, ADULT - FALL HARM RISK - UNIVERSAL INTERVENTIONS
Bed in lowest position, wheels locked, appropriate side rails in place/Call bell, personal items and telephone in reach/Instruct patient to call for assistance before getting out of bed or chair/Non-slip footwear when patient is out of bed/[place] to call system/Physically safe environment - no spills, clutter or unnecessary equipment/Purposeful Proactive Rounding/Room/bathroom lighting operational, light cord in reach

## 2021-12-22 NOTE — ASU DISCHARGE PLAN (ADULT/PEDIATRIC) - ASU DC SPECIAL INSTRUCTIONSFT
Abscess Drain Check    Discharge Instructions  - You have had a drain checked.  - Keep the area clean and dry.  - Do not soak in a tub or pool with the drain, however you may shower with the drain and dressing covered in plastic wrap.  - Do not put traction on the drain and be careful that the drain does not get accidentally dislodged or kinked.  - Record output daily from the drain. Empty the bag as needed.  - You may resume your normal diet.  - You may resume your normal medications.  - It is normal to experience some pain over the site for the next few days. You may take apply ice to the area (20 minutes on, 20 minutes off) and take Tylenol for that pain. Do not take more frequently than every 6 hours and do not exceed more than 3000mg of Tylenol in a 24 hour period.    Notify your primary physician and/or Interventional Radiology IMMEDIATELY if you experience any of the following       - Fever of 100.4F  or 38C       - Chills or Rigors/ Shakes       - Swelling and/or Redness in the area of the puncture site       - Worsening Pain       - Blood soaked bandages or worsening bleeding       - Lightheadedness and/or dizziness upon standing       - Chest Pain/ Tightness       - Shortness of Breath       - Difficulty walking    If you have a problem that you believe requires IMMEDIATE attention, please go to your NEAREST Emergency Room. If you believe your problem can safely wait until you speak to a physician, please call Interventional Radiology for any concerns.    During Normal Weekday Business Hours- You can contact the Interventional Radiology department during normal business hours via telephone.  During Evenings and Weekends- If you need to contact Interventional Radiology during off hours, do so by calling the hospital and requesting to be connected to the Interventional Radiologist on call.

## 2021-12-22 NOTE — PRE PROCEDURE NOTE - PRE PROCEDURE EVALUATION
Interventional Radiology    HPI: 61y Male with a PMH of perforated appendicitis with large abscess, s/p percutaneous drainage of abdominal abscess on 12/3 in [facility] IR with Dr. Machuca. Patient here today for abscess drain evaluation.    Allergies:   Medications (Abx/Cardiac/Anticoagulation/Blood Products)      Exam  General: No acute distress  Chest: Non labored breathing  Abdomen: Non-distended  Extremities: No swelling, warm    Plan:  61y Male with a PMH of perforated appendicitis with large abscess, s/p percutaneous drainage of abdominal abscess on 12/3 in [facility] IR with Dr. Machuca. Patient here today for abscess drain evaluation.  -Risks/Benefits/alternatives explained with the patient and/or healthcare proxy and witnessed informed consent obtained.

## 2021-12-22 NOTE — ASU DISCHARGE PLAN (ADULT/PEDIATRIC) - NS MD DC FALL RISK RISK
For information on Fall & Injury Prevention, visit: [email or web address] OR  [email or web address] OR  https://www.cdc.gov/steadi/patient.html

## 2021-12-28 ENCOUNTER — APPOINTMENT (OUTPATIENT)
Dept: ENDOCRINOLOGY | Facility: CLINIC | Age: 61
End: 2021-12-28

## 2022-01-01 LAB
CULTURE RESULTS: SIGNIFICANT CHANGE UP
SPECIMEN SOURCE: SIGNIFICANT CHANGE UP

## 2022-01-03 DIAGNOSIS — K65.1 PERITONEAL ABSCESS: ICD-10-CM

## 2022-01-03 DIAGNOSIS — K63.2 FISTULA OF INTESTINE: ICD-10-CM

## 2022-01-03 DIAGNOSIS — Z46.82 ENCOUNTER FOR FITTING AND ADJUSTMENT OF NON-VASCULAR CATHETER: ICD-10-CM

## 2022-02-02 ENCOUNTER — RESULT REVIEW (OUTPATIENT)
Age: 62
End: 2022-02-02

## 2022-02-02 ENCOUNTER — OUTPATIENT (OUTPATIENT)
Dept: OUTPATIENT SERVICES | Facility: HOSPITAL | Age: 62
LOS: 1 days | End: 2022-02-02
Payer: MEDICAID

## 2022-02-02 VITALS
WEIGHT: 169.98 LBS | DIASTOLIC BLOOD PRESSURE: 79 MMHG | OXYGEN SATURATION: 99 % | HEIGHT: 70 IN | TEMPERATURE: 100 F | RESPIRATION RATE: 18 BRPM | HEART RATE: 85 BPM | SYSTOLIC BLOOD PRESSURE: 130 MMHG

## 2022-02-02 DIAGNOSIS — K65.1 PERITONEAL ABSCESS: ICD-10-CM

## 2022-02-02 DIAGNOSIS — R18.8 OTHER ASCITES: ICD-10-CM

## 2022-02-02 DIAGNOSIS — Z90.49 ACQUIRED ABSENCE OF OTHER SPECIFIED PARTS OF DIGESTIVE TRACT: Chronic | ICD-10-CM

## 2022-02-02 PROCEDURE — 49424 ASSESS CYST CONTRAST INJECT: CPT

## 2022-02-02 PROCEDURE — 76080 X-RAY EXAM OF FISTULA: CPT | Mod: 26

## 2022-02-02 PROCEDURE — 76080 X-RAY EXAM OF FISTULA: CPT

## 2022-02-02 NOTE — ASU DISCHARGE PLAN (ADULT/PEDIATRIC) - NURSING INSTRUCTIONS
Please feel free to contact us at [phone] if any problems arise. After 6PM, Monday through Friday, on weekends and on holidays, please call [phone] and ask for the radiology resident on call to be paged.

## 2022-02-02 NOTE — ASU PREOP CHECKLIST - SITE MARKED BY SURGEON
Assessment completed in BMT clinic on 9/9/20, see information below for inpatient review.     Blood and Marrow Transplant   Psychosocial Assessment with   Clinical      Assessment completed on 9/9/20 via phone as part of the COVID 19 protocol. Assessment of living situation, support system, financial status, functional status, coping, stressors, need for resources and social work intervention provided as needed. Information for this assessment was provided by pt and pt's son's (Robles) long-term significant other (Gaye) report in addition to medical chart review and consultation with medical team.      Present at Assessment:   Patient: Florencia Gao  Son's (Robles) long-term (14yrs) significant other: Gaye Colvin  : ROSSI Pabon, LG  : ROSSI Sawant, [facility]     Diagnosis: Acute Myeloid Leukemia (AML)     Date of Diagnosis: 6/2020     Transplant type: Allogeneic      Donor: Related allogeneic donor stem cell transplant  Donor: Alfonso Law (brother)     Physician: Yong Moncada MD     Workup Nurse Coordinator: Snehal Reza RN  Primary Nurse Coordinator: Britany Finch RN     Social Workers: ROSSI Pabon, LGLIANNA     Permanent Address:   [address] [place] [address]   [place] [address]     Living Situation: Lives alone in [place], [place] (36 minutes/ 30[place]2 [place] from [facility]). MD stated that patient will be required to relocate post-transplant.      Local Address:   [facility], [facility] or [facility].     Pt is on the [facility] wait list (currently #15) & is looking into other local lodging options. She is also interested in the [facility] if this becomes available. Florencia & family are agreeable to relocation request by MD, though they have also expressed they'd be interested in discussing further with MD a plan to potentially return closer to home w/ family in [place], [place] father out from transplant pending Pt's condition/recovery.   Addendum: On 9/9/20, SW talked w/  "Dr. Moncada re: above relocation. Dr. Moncada is open to Pt returning home, but not before day 60 and will continue to assess pending Pt's recovery. Pt will continue to have these conversations w/ Dr. Moncada. SW notified patient.      Contact Information:  Pt's Cell Phone: [phone]  Son's sig other/Gaye's Cell Phone: [phone]  Son Robles Gao's Cell Phone: [phone]     Presenting Information:  Florencia is a 75 year old female diagnosed with AML who presents for evaluation for Allogenic transplant at the [facility] ([facility]). Pt was accompanied to today's visit by her son's (Robles) long-term (14 yrs) significant other Gaye Colvin.      Decision Making: Self     Health Care Directive: Pt reports she has the form and is having discussions with their family regarding their health care wishes. Pt said she wants to complete her Healthcare Directive in the next few days. Pt reports she would like to designate Gaye Colvin to be her Health Care Agent and plans to complete this prior to admission. SW explained that since she does not have a healthcare directive, legally her two living sons would make decisions on her behalf, if she did not have capacity to make her own medical decisions. Pt understood. Pt has previously expressed concern that her sons do not communicate with each other (stating \"they hardly speak\").     Relationship Status:      Special Needs: Local Lodging Needed.      Family/Support System: Pt endorsed a strong support system including family, close friends, and her sanjeev community who will be available to support pt throughout transplant process.      Spouse: NA  Children: 2 living sons Robles ([place], [place]) & Parmjit ([place], [place]); Pt has a son Jacoby ( last year following a motorcycle accident)  Grandchildren: 3 grandchildren (ages 24, 13, 7)  Parents:   Siblings: 2 older sisters & 4 younger brothers; all live in [facility] except for 1 brother lives in [place], [place].   Friends: Pt endorsed a good friend support system.     Caregiver: LIANNA discussed with pt and pt's son's significant other the caregiver role and expectation at length. Pt is agreeable to having a full time caregiver for a minimum of 100 days until cleared by the BMT physician. Pt and pt's son's significant other(Gaye) confirmed understanding of the caregiver requirement. Pt's primary caregiver will be Son (Robles) and his long-term significant other (Gaye Colvin) with Pt's sister Martha and brother Silverio as a secondary caregivers. Caregiver education and resources provided. No caregiver concerns identified.      Caregiver Contact Information:  Son's sig other/Gaye's Cell Phone: [phone]  Son Robles Gao's Cell Phone: [phone]     Transportation Mode: Private vehicle. Pt is aware of driving restrictions post-BMT and the need for the caregiver is to drive until cleared to drive by the BMT physician. SW provided information on parking info and monthly parking pass options.      Insurance: Pt has [employer]vantage health insurance. Pt denied specific insurance concerns at this time. SW reiterated information about the [employer]  should specific insurance questions arise as pt moves through transplant process. Future Insurance questions referred to [facility] [employer] : Natalia Goncalves (P: [phone]).     Sources of Income: Pt is supported by her snf income. Pt denied anticipation of financial hardship related to BMT at this time. SW provided information on neelam options and encouraged pt to contact this SW for support should financial situation change.      Employment: Retired (10 yrs) .      Mental Health:  Pt reported a hx of anxiety and panic attacks. Pt is currently taking medication (for the last 2 years) and pt feels the medication is working well. SW explained that it's not uncommon for  "patients going through transplant to experience symptoms of depression/anxiety. Pt endorsed history of panic attacks that have been experienced in times of high stress such as going through cancer treatment. She identified what that looks like for her and states she experiences chest pain. Pt reports coping mechanisms for panic attacks and anxiety includes her paul chi routine, deep breathing, and \"putting my head down.\" Pt believes she would be able to identify symptoms of anxiety throughout the transplant process.      PHQ-9:  Pt scored a 0/27 which indicates \"none\" on the depression severity scale. Pt endorses this is an accurate reflection of her emotional state.     GAD7:  Pt scored a 1/21 which indicates no sign of anxiety on the Generalized Anxiety Disorder Questionnaire. Pt endorses this is an accurate reflection of her emotional state. Pt discussed her hx of anxiety and feels that she has been able to effectively cope and manage her symptoms as they arise.      Chemical Use:   Tobacco: No  Alcohol: No current use; no concerns w/ abstinence from alcohol post-transplant  Marijuana: No  Other Drugs: No  Based on the information provided, there appear to be no specific risks or concerns identified at this time.      Trauma/Loss/Abuse History: Multiple losses associated with cancer diagnosis and treatment, including health, changes to physical appearance, etc.      Spirituality: Pt identified as Latter-day and is a part of the [facility] [employer] in [place], [place]. She endorses that they are aware of her diagnosis and are supportive. SW explained that there are Chaplains on the unit and pt can request to meet with a  at anytime. Florencia is interested in meeting regularly with unit  and having reiki services. Pt would also like a blessing ceremony with the unit  on the day of her transplant.     Coping: Pt noted that she is currently feeling \"hopeful\". Pt shared that her main coping " "mechanisms are talking with family/friends, prayer/spiritual practices, crossword puzzles, playing games on her phone, and watching movies. SW and pt discussed additional positive coping mechanisms that pt can utilize while in the hospital. While hospitalized, pt plans to continue her paul chi routine, watch movies, and play games on her phone. Florencia also endorsed that she has found it \"helpful\" that her son's significant other (Gaye) is a nurse and her medical knowledge has been \"useful\" during this time.     Caregiver Coping: Caregiver did not note any concerns and endorsed that she has all the education needed at this time.      Education Provided: Transplant process expectations, Caregiver requirements, Caregiver self-care, Financial issues related to transplant, Financial resources/grants available, Common psychosocial stressors pre/post transplant, Support group(s) available, Hospital resources available, Web site information, Social Work role and Resources for grandchildren.     Interventions Provided: Psychosocial Support and Education      Assessment and Recommendations for Team:  Pt is a 75 year old female diagnosed with AML who is here undergoing preparation for a planned Allogenic transplant.      Pt is pleasant, calm and able to articulate concerns/coping mechanisms in an appropriate manner. During our meeting pt was alert, was interactive, and she presented appropriate memory and thought processes. Pt feels comfortable communicating with the medical team. Pt has a strong supportive network of family and friends who are involved. Pt has developed strong coping mechanisms. Pt and pt's caregivers will benefit from ongoing psychosocial support in regards to coping with the adjustment to the BMT process.      Pt has a strong support system and a confirmed caregiver plan. Pt verbalizes understanding of the transplant process and wanting to proceed. SW provided contact information and encouraged pt to " contact SW with questions, concerns, resources and for support.     Per this assessment, SW did not identify any barriers to this patient moving forward with transplant.     Important Information:   -Hx of anxiety and panic attacks; see mental section.     Follow up Planned:   Psychosocial support  Lodging referrals - Pt is on the [facility] Wait List (Currently #15)  Healthcare Directive - Follow-up inpatient  Spiritual Health referral - Pt would like a blessing ceremony day of transplant, regular  visits and Reiki while inpatient.     ROSSI Pabon, [facility]  Adult Blood & Marrow Transplant   Phone: [phone]  Pager: [phone]   n/a

## 2022-02-02 NOTE — PRE PROCEDURE NOTE - PRE PROCEDURE EVALUATION
Interventional Radiology    HPI: 62y Male with history of perforated appendicitis s/p abscess drain placement 12/3. Last check 12/22 with decreased size of abscess, however fistula visualized. Patient states no drainage for three days.    Allergies:   Medications (Abx/Cardiac/Anticoagulation/Blood Products)      Data:  177.8  77.1  T(C): 37.6  HR: 85  BP: 130/79  RR: 18  SpO2: 99%    Exam  General: No acute distress  Chest: Non labored breathing  Abdomen: Non-distended  Extremities: No swelling, warm    Imaging:   < from: CT Abdomen and Pelvis No Cont (12.22.21 @ 11:02) >  IMPRESSION:    Interval decompression of right lower quadrant abscess with drainage   catheter in place.    < end of copied text >    Plan: 62y Male presents for abscess drain evaluation.   -Risks/Benefits/alternatives explained with the patient and/or healthcare proxy and witnessed informed consent obtained.

## 2022-02-02 NOTE — PROCEDURE NOTE - PLAN
Patient to follow up with Dr. Barbosa for surgical evaluation. Follow up with IR in 2 weeks for routine evaluation.

## 2022-02-02 NOTE — ASU PATIENT PROFILE, ADULT - NS TRANSFER DENTURES
Message relayed to patient regarding preop antibiotic Clindamycin 300mg two tablets to be taken one hour prior to each excision procedure.  Verbalized understanding.   Partial/Upper/Lower

## 2022-02-02 NOTE — PROCEDURE NOTE - PROCEDURE FINDINGS AND DETAILS
Successful evaluation of abscess drain catheter. 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

## 2022-02-02 NOTE — ASU DISCHARGE PLAN (ADULT/PEDIATRIC) - CARE PROVIDER_API CALL
Nayana Barbosa)  Critical Care Medicine; Surgery  [address] [place] [address]  Phone: [phone]  Fax: [phone]  Follow Up Time:

## 2022-02-02 NOTE — ASU PATIENT PROFILE, ADULT - FALL HARM RISK - UNIVERSAL INTERVENTIONS
Bed in lowest position, wheels locked, appropriate side rails in place/Call bell, personal items and telephone in reach/Instruct patient to call for assistance before getting out of bed or chair/Non-slip footwear when patient is out of bed/[place] to call system/Physically safe environment - no spills, clutter or unnecessary equipment/Purposeful Proactive Rounding/Room/bathroom lighting operational, light cord in reach

## 2022-02-02 NOTE — ASU PREOP CHECKLIST - BLOOD AVAILABLE
DISCHARGE SUMMARY    Patient ID:   Paula Durbin  [id number]; 76 year old 1941    Admit date: 6/27/2017    Discharge date:  6/28/2017    PCP: Reshma Charles NP     DISCHARGE DIAGNOSIS:  Ventral hernia    OTHER COMORBIDITIES:  Active Hospital Problems    Diagnosis Date Noted   • Ventral hernia 06/27/2017     Priority: Low       PROCEDURES PERFORMED DURING THIS STAY:  Laparoscopic Ventral Hernia Repair, performed on 6/27/2017, see dictated operative report for details.    Labs  No results found    Recent Labs  Lab 06/27/17  0630   POTASSIUM 4.5     No results found    Hospital Course:  . Ms. Durbin is a 76 year old female who has a h/o of ventral incisional hernia and underwent the above procedure on 6/27/17. The procedure went as expected without any known complications. The patient's pain has been under control, she has tolerated advancing diet, passing flatus and is ready for dc to home.  The patient will follow up with Dr Chino as scheduled.    PATIENTS CURRENT VITALS & PHYSICAL EXAM  Visit Vitals  /68 (BP Location: [facility], Patient Position: Semi-Brown's)   Pulse 70   Temp 98.2 °F (36.8 °C) (Oral)   Resp 19   Ht 5' 6\" (1.676 m)   Wt 102.4 kg   LMP  (LMP Unknown)   SpO2 96%   BMI 36.44 kg/m²     General:   awake, alert and oriented and in no acute distress  Lungs:   clear to auscultation and good air entry  Cardiovascular:   no clicks, rubs or murmurs and regular rate and rhythm  Abdomen:   +BS, soft, obese, non distended and mildly tender t/o, carson periumbilical area. No rebound, guarding or peritoneal signs. Binder in place, removed for exam and replaced.  Incisions:  Dermabond intact over healing incisions. No erythema, drainage or dehiscence noted.    Allergies:  ALLERGIES:  Seasonal and Singulair    Discharge Medications:  See after visit summary    Patient Instructions:  See discharge instructions.    Follow-up:  Celso Chino MD  [address] [place] [address] DR Elda RAMIRES  [id number]  [phone]            Code Status:     Full Resuscitation    Signed:  Oksana Brand PA-C  6/27/2017  2:39 PM       n/a

## 2022-02-14 DIAGNOSIS — Z46.82 ENCOUNTER FOR FITTING AND ADJUSTMENT OF NON-VASCULAR CATHETER: ICD-10-CM

## 2022-02-17 ENCOUNTER — RESULT REVIEW (OUTPATIENT)
Age: 62
End: 2022-02-17

## 2022-02-17 ENCOUNTER — OUTPATIENT (OUTPATIENT)
Dept: OUTPATIENT SERVICES | Facility: HOSPITAL | Age: 62
LOS: 1 days | End: 2022-02-17
Payer: MEDICAID

## 2022-02-17 VITALS
OXYGEN SATURATION: 100 % | HEIGHT: 70 IN | TEMPERATURE: 98 F | HEART RATE: 96 BPM | DIASTOLIC BLOOD PRESSURE: 90 MMHG | SYSTOLIC BLOOD PRESSURE: 132 MMHG | RESPIRATION RATE: 20 BRPM | WEIGHT: 169.98 LBS

## 2022-02-17 DIAGNOSIS — Z90.49 ACQUIRED ABSENCE OF OTHER SPECIFIED PARTS OF DIGESTIVE TRACT: Chronic | ICD-10-CM

## 2022-02-17 DIAGNOSIS — K65.1 PERITONEAL ABSCESS: ICD-10-CM

## 2022-02-17 LAB — GLUCOSE BLDC GLUCOMTR-MCNC: 180 MG/DL — HIGH (ref 70–99)

## 2022-02-17 PROCEDURE — 82962 GLUCOSE BLOOD TEST: CPT

## 2022-02-17 PROCEDURE — 49424 ASSESS CYST CONTRAST INJECT: CPT

## 2022-02-17 PROCEDURE — 76080 X-RAY EXAM OF FISTULA: CPT | Mod: 26

## 2022-02-17 PROCEDURE — 76080 X-RAY EXAM OF FISTULA: CPT

## 2022-02-17 NOTE — PROCEDURE NOTE - PLAN
Patient has a scheduled appointment next Tuesday 2/22/2022 with a surgeon for evaluation. Follow up with IR if instructed by surgeon.

## 2022-02-17 NOTE — ASU PATIENT PROFILE, ADULT - FALL HARM RISK - UNIVERSAL INTERVENTIONS
Bed in lowest position, wheels locked, appropriate side rails in place/Call bell, personal items and telephone in reach/Instruct patient to call for assistance before getting out of bed or chair/Non-slip footwear when patient is out of bed/[place] to call system/Physically safe environment - no spills, clutter or unnecessary equipment/Purposeful Proactive Rounding/Room/bathroom lighting operational, light cord in reach

## 2022-02-17 NOTE — ASU DISCHARGE PLAN (ADULT/PEDIATRIC) - NS MD DC FALL RISK RISK
For information on Fall & Injury Prevention, visit: [email or web address] OR  [email or web address]-injury OR  https://www.cdc.gov/steadi/patient.html

## 2022-02-17 NOTE — ASU DISCHARGE PLAN (ADULT/PEDIATRIC) - ASU DC SPECIAL INSTRUCTIONSFT
Drain Check    Discharge Instructions  - You have had a drain checked.  - Keep the area clean and dry.  - Do not soak in a tub or pool with the drain, however you may shower with the drain and dressing covered in plastic wrap.  - Do not put traction on the drain and be careful that the drain does not get accidentally dislodged or kinked.  - Record output daily from the drain. Empty the bag as needed.  - You may resume your normal diet.  - You may resume your normal medications.  - It is normal to experience some pain over the site for the next few days. You may take apply ice to the area (20 minutes on, 20 minutes off) and take Tylenol for that pain. Do not take more frequently than every 6 hours and do not exceed more than 3000mg of Tylenol in a 24 hour period.    Notify your primary physician and/or Interventional Radiology IMMEDIATELY if you experience any of the following       - Fever of 101F or 38C       - Chills or Rigors/ Shakes       - Swelling and/or Redness in the area of the puncture site       - Worsening Pain       - Blood soaked bandages or worsening bleeding       - Lightheadedness and/or dizziness upon standing       - Chest Pain/ Tightness       - Shortness of Breath       - Difficulty walking    If you have a problem that you believe requires IMMEDIATE attention, please go to your NEAREST Emergency Room. If you believe your problem can safely wait until you speak to a physician, please call Interventional Radiology for any concerns.    Please feel free to contact us at [phone] if any problems arise. After 6PM, Monday through Friday, on weekends and on holidays, please call [phone] and ask for the radiology resident on call to be paged. Drain Check    Discharge Instructions  - You have had a drain checked.  - Keep the area clean and dry.  - Do not soak in a tub or pool with the drain, however you may shower with the drain and dressing covered in plastic wrap.  - Do not put traction on the drain and be careful that the drain does not get accidentally dislodged or kinked.  - Record output daily from the drain. Empty the bag as needed.  - You may resume your normal diet.  - You may resume your normal medications.  - It is normal to experience some pain over the site for the next few days. You may take apply ice to the area (20 minutes on, 20 minutes off) and take Tylenol for that pain. Do not take more frequently than every 6 hours and do not exceed more than 3000mg of Tylenol in a 24 hour period.    Notify your primary physician and/or Interventional Radiology IMMEDIATELY if you experience any of the following       - Fever of 101F or 38C       - Chills or Rigors/ Shakes       - Swelling and/or Redness in the area of the puncture site       - Worsening Pain       - Blood soaked bandages or worsening bleeding       - Lightheadedness and/or dizziness upon standing       - Chest Pain/ Tightness       - Shortness of Breath       - Difficulty walking    If you have a problem that you believe requires IMMEDIATE attention, please go to your NEAREST Emergency Room. If you believe your problem can safely wait until you speak to a physician, please call Interventional Radiology for any concerns.    Please feel free to contact us at [phone] if any problems arise. After 6PM, Monday through Friday, on weekends and on holidays, please call [phone] and ask for the radiology resident on call to be paged.    Follow up with Dr. stokes on 2/22.

## 2022-02-17 NOTE — ASU PREOP CHECKLIST - SITE MARKED BY SURGEON
Patient c/o R lower back pain radiating down to buttock. Denies loss of BB/numbness or tingling    n/a

## 2022-02-17 NOTE — PROCEDURE NOTE - PROCEDURE FINDINGS AND DETAILS
Successful evaluation of abscess drain catheter. 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

## 2022-02-17 NOTE — PRE PROCEDURE NOTE - PRE PROCEDURE EVALUATION
Interventional Radiology    HPI: 62 year old male with hx of perforated appendicitis s/p abscess drain placement 12/3. 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 Pt is back today for abscess drain check.    Allergies:   Medications (Abx/Cardiac/Anticoagulation/Blood Products)      Data:  177.8  77.1  T(C): 36.8  HR: 96  BP: 132/90  RR: 20  SpO2: 100%    Exam  General: No acute distress  Chest: Non labored breathing  Abdomen: Non-distended  Extremities: No swelling, warm          Imaging: < from: IR Procedure (02.02.22 @ 15:49) >  Impression:    Right lower quadrant tube check demonstrated a small residual collection   with fistulous communication to the adjacent bowel.    Patient will follow-up in 2 weeks to evaluate patency of the fistula.    --- End of Report ---              JOSEFINA MEANS MD; Attending Interventional Radiologist  This document has been electronically signed. Feb 8 2022  4:34PM    < end of copied text >  Plan: 62y Male presents for abscess drain check  -Risks/Benefits/alternatives explained with the patient and/or healthcare proxy and witnessed informed consent obtained.    Interventional Radiology    HPI: 62 year old male with hx of perforated appendicitis s/p abscess drain placement 12/3. 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 Pt is back today for abscess drain check. Denies output from the drain, leaking, fever, chills, abdominal pain. Following up with surgeon Dr. Barbosa on 2/22.    Allergies:   Medications (Abx/Cardiac/Anticoagulation/Blood Products)      Data:  177.8  77.1  T(C): 36.8  HR: 96  BP: 132/90  RR: 20  SpO2: 100%    Exam  General: No acute distress  Chest: Non labored breathing  Abdomen: Non-distended  Extremities: No swelling, warm          Imaging: < from: IR Procedure (02.02.22 @ 15:49) >  Impression:    Right lower quadrant tube check demonstrated a small residual collection   with fistulous communication to the adjacent bowel.    Patient will follow-up in 2 weeks to evaluate patency of the fistula.    --- End of Report ---              JOSEFINA MEANS MD; Attending Interventional Radiologist  This document has been electronically signed. Feb 8 2022  4:34PM    < end of copied text >  Plan: 62y Male presents for abscess drain check  -Risks/Benefits/alternatives explained with the patient and/or healthcare proxy and witnessed informed consent obtained.

## 2022-02-17 NOTE — ASU DISCHARGE PLAN (ADULT/PEDIATRIC) - NURSING INSTRUCTIONS
Please feel free to contact us at [phone] if any questions or concerns arise. After 6PM on Monday through Friday (and weekends and holidays) please call [phone] and ask for the radiology resident on call to be paged..

## 2022-02-22 ENCOUNTER — APPOINTMENT (OUTPATIENT)
Dept: TRAUMA SURGERY | Facility: HOSPITAL | Age: 62
End: 2022-02-22
Payer: MEDICAID

## 2022-02-22 ENCOUNTER — RESULT REVIEW (OUTPATIENT)
Age: 62
End: 2022-02-22

## 2022-02-22 VITALS
WEIGHT: 177 LBS | BODY MASS INDEX: 25.34 KG/M2 | HEIGHT: 70 IN | DIASTOLIC BLOOD PRESSURE: 90 MMHG | SYSTOLIC BLOOD PRESSURE: 125 MMHG | TEMPERATURE: 97.2 F | HEART RATE: 103 BPM

## 2022-02-22 DIAGNOSIS — F17.200 NICOTINE DEPENDENCE, UNSPECIFIED, UNCOMPLICATED: ICD-10-CM

## 2022-02-22 DIAGNOSIS — Z86.39 PERSONAL HISTORY OF OTHER ENDOCRINE, NUTRITIONAL AND METABOLIC DISEASE: ICD-10-CM

## 2022-02-22 PROCEDURE — 99214 OFFICE O/P EST MOD 30 MIN: CPT

## 2022-02-22 NOTE — HISTORY OF PRESENT ILLNESS
[de-identified] : 61 yo M admitted to [facility] on 12/3/21 with perforated appendicitis, s/p IR drain. Subsequent tube checks demonstrate small persistent cavity with fistula to bowel. Pt reports minimal RLQ pain, mostly related to the drain. Denies fevers/chills. Tolerating diet. Reports normal bowel movements. Pt would like tube removed. \par \par Pt with poorly controlled diabetes (reports A1C 8+), on insulin, with diabetic neuropathy. Pt also active smoker (1/2ppd).

## 2022-02-22 NOTE — REVIEW OF SYSTEMS
[Abdominal Pain] : abdominal pain [Limb Pain] : limb pain [Negative] : Heme/Lymph [Vomiting] : no vomiting [Constipation] : no constipation [Diarrhea] : no diarrhea [de-identified] : Hypersensitivity

## 2022-02-22 NOTE — PLAN
[FreeTextEntry1] : - CT abd pelvis for preop planning \par - Colonoscopy to r/o malignancy prior to surgery \par - Keep IR drain \par - Medical and cardiac clearance \par - Improve diabetic control \par - Smoking cessation \par - Follow up after CT and colonoscopy

## 2022-02-22 NOTE — PHYSICAL EXAM
[Normal Breath Sounds] : Normal breath sounds [Normal Heart Sounds] : normal heart sounds [No Rash or Lesion] : No rash or lesion [Alert] : alert [Oriented to Person] : oriented to person [Oriented to Place] : oriented to place [Oriented to Time] : oriented to time [Calm] : calm [Abdominal Masses] : No abdominal masses [de-identified] : NAD [de-identified] : Soft, nondistended, nontender, IR drain with approx 20cc seropurulent fluid, granulation tissue around the drain [de-identified] : No deformities

## 2022-02-22 NOTE — ASSESSMENT
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 Fistula is unlikely to close spontaneously at this point, so will plan for surgical intervention, most likely ileocecectomy. Pt is due to colonoscopy, which should be completed prior to surgery to rule out malignancy as the source of perforation. Will also obtain repeat CT to evaluate collection. Pt will need medical and cardia clearance, and he was advised he will need to stop smoking for 2-3 weeks prior to surgery.

## 2022-02-23 DIAGNOSIS — K63.2 FISTULA OF INTESTINE: ICD-10-CM

## 2022-02-23 DIAGNOSIS — K65.1 PERITONEAL ABSCESS: ICD-10-CM

## 2022-02-23 DIAGNOSIS — Z46.82 ENCOUNTER FOR FITTING AND ADJUSTMENT OF NON-VASCULAR CATHETER: ICD-10-CM

## 2022-03-17 ENCOUNTER — APPOINTMENT (OUTPATIENT)
Dept: ENDOCRINOLOGY | Facility: CLINIC | Age: 62
End: 2022-03-17

## 2022-03-24 ENCOUNTER — APPOINTMENT (OUTPATIENT)
Dept: CARDIOLOGY | Facility: HOSPITAL | Age: 62
End: 2022-03-24

## 2022-03-28 ENCOUNTER — EMERGENCY (EMERGENCY)
Facility: HOSPITAL | Age: 62
LOS: 1 days | Discharge: HOME | End: 2022-03-28
Attending: EMERGENCY MEDICINE | Admitting: EMERGENCY MEDICINE
Payer: MEDICAID

## 2022-03-28 VITALS
OXYGEN SATURATION: 100 % | DIASTOLIC BLOOD PRESSURE: 72 MMHG | TEMPERATURE: 98 F | SYSTOLIC BLOOD PRESSURE: 152 MMHG | RESPIRATION RATE: 14 BRPM | HEART RATE: 80 BPM | HEIGHT: 70 IN

## 2022-03-28 VITALS
RESPIRATION RATE: 16 BRPM | SYSTOLIC BLOOD PRESSURE: 130 MMHG | HEART RATE: 85 BPM | OXYGEN SATURATION: 100 % | DIASTOLIC BLOOD PRESSURE: 90 MMHG

## 2022-03-28 DIAGNOSIS — Z90.49 ACQUIRED ABSENCE OF OTHER SPECIFIED PARTS OF DIGESTIVE TRACT: Chronic | ICD-10-CM

## 2022-03-28 PROCEDURE — 99284 EMERGENCY DEPT VISIT MOD MDM: CPT

## 2022-03-28 RX ORDER — GABAPENTIN 400 MG/1
600 CAPSULE ORAL ONCE
Refills: 0 | Status: COMPLETED | OUTPATIENT
Start: 2022-03-28 | End: 2022-03-28

## 2022-03-28 RX ORDER — IBUPROFEN 200 MG
600 TABLET ORAL ONCE
Refills: 0 | Status: COMPLETED | OUTPATIENT
Start: 2022-03-28 | End: 2022-03-28

## 2022-03-28 RX ADMIN — GABAPENTIN 600 MILLIGRAM(S): 400 CAPSULE ORAL at 15:13

## 2022-03-28 RX ADMIN — Medication 600 MILLIGRAM(S): at 17:08

## 2022-03-28 NOTE — ED PROVIDER NOTE - PATIENT PORTAL LINK FT
You can access the FollowMyHealth Patient Portal offered by [facility] by registering at the following website: [email or web address]. By joining [facility]’s FollowMyHealth portal, you will also be able to view your health information using other applications (apps) compatible with our system.

## 2022-03-28 NOTE — PROVIDER CONTACT NOTE (OTHER) - ASSESSMENT
Pt was demanding for a Medicaid taxi, but provider Dr. Courtney pt is ambulatory with no assistance and able to take a bus, there is no Medical needs to order a Medicaid Taxi. I gave pt a [employer] card. He was not happy. Pt asked for Dinner, I provided Dinner. After eating Dinner Pt  walked out of ED to the bus stop.

## 2022-03-28 NOTE — ED PROVIDER NOTE - CLINICAL SUMMARY MEDICAL DECISION MAKING FREE TEXT BOX
62 Y M H/o ruptured appy presenting with drain malfuction, bulb displaced, afebrile, non tender, no indication for labs/imaging at thsit calvin, IR consul.t, re-assess, likely DC with IR followup.

## 2022-03-28 NOTE — ED ADULT TRIAGE NOTE - NS ED TRIAGE AVPU SCALE
Alert-The patient is alert, awake and responds to voice. The patient is oriented to time, place, and person. The triage nurse is able to obtain subjective information. Private car

## 2022-03-28 NOTE — ED ADULT NURSE NOTE - OBJECTIVE STATEMENT
Pt to bed 8. Alert and oriented x 3. Pt c/o missing right sided drain bag. States that he unhooked it and does not have any more. Abd drain dressed with mild amount of drainage noted to dressing. Pt in no acute distress. Will continue to monitor.

## 2022-03-28 NOTE — ED ADULT NURSE REASSESSMENT NOTE - NS ED NURSE REASSESS COMMENT FT1
Break RN note- patient complaining of pain. Dr. King aware. Patient medicated as ordered. Safety maintained. Patient stable upon exiting the room.

## 2022-03-28 NOTE — ED PROVIDER NOTE - NSFOLLOWUPINSTRUCTIONS_ED_ALL_ED_FT
Please follow up with your primary care provider for further concerns you may have regarding your general health. Attached you will find your results from today's visit. Continue taking your medications as prescribed and keep your upcoming medical appointments.    Please continue managing your drain as we discussed and follow up with interventional radiology for your drain per your follow up schedule.

## 2022-03-28 NOTE — ED PROVIDER NOTE - OBJECTIVE STATEMENT
62 Y M H/O ruptured appy on 2/2/22 presenting with  [id number] 62 Y M H/O ruptured appy on 2/2/22 presenting with right sided abdominal pain after pulling bulb off his drain. Pt now complaining of pain at the site. Pt denies fever, chills, nausea, vomiting. Has follow up with IR this week.

## 2022-03-28 NOTE — CHART NOTE - NSCHARTNOTEFT_GEN_A_CORE
Called by team for new gravity drainage bag for RLQ abscess drain placed by IR. Patient seen at bedside. NAD. Denies pain/fever/chills. States his bag gives off foul odor and he would like a new gravity drainage bag and his dressing changed. Site c/d/i, dressing changed, gravity drainage bag  to drain with feculent material draining. D/w team.    [id number]

## 2022-03-28 NOTE — ED ADULT TRIAGE NOTE - CHIEF COMPLAINT QUOTE
pt c/o right sided pain 8/10 with trouble from abdominal drainage tube, pt noted changes in drainage over the last week, bag no longer attached, drain line clampped. pt out of gabapentine and needs dose for generalized pain. denies fever./ chills/ n/v. fs= 69, pt given juice

## 2022-03-28 NOTE — ED PROVIDER NOTE - PROGRESS NOTE DETAILS
Parish Courtney MD:  Discussed with IR, request discuss with IR PA [id number], will attempt call back. Parish Courtney MD:  Attempted IR PA [id number] x2 times, will attempt call back. Paged at [phone], awaiting call back Chela PGY3: bulb replaced by IR, pt otherwise asymptomatic. Plan for DC w/ follow up as scheduled. Pt was re-evaluated at bedside, VSS, feeling better overall. Results were discussed with patient as well as return precautions and follow up plan with PCP and/or specialist. Time was taken to answer any questions that the patient had before providing them with discharge paperwork.

## 2022-03-28 NOTE — ED PROVIDER NOTE - ATTENDING CONTRIBUTION TO CARE
Attending note:   After face to face evaluation of this patient, I concur with above noted hx, pe, and care plan for this patient.  Bell: 62 Y M H/O ruptured appy on 2/2/22 presenting with right sided abdominal pain after pulling bulb off his drain. Pt now complaining of pain at the site. Pt also states he has chronic pain for which he takes meds and did not bring it with him.  Pt denies fever, chills, nausea, vomiting. Has follow up with IR this week. Pt is well appearing, no tenderness on abdominal exam. no CVAT, no surrounding erythema, edema or discharge around drain in RLQ. Attending note:   After face to face evaluation of this patient, I concur with above noted hx, pe, and care plan for this patient.  Bell: 62 Y M H/O ruptured appy on 2/2/22 presenting with right sided abdominal pain after pulling bulb off his drain. Pt now complaining of pain at the site. Pt also states he has chronic pain for which he takes meds and did not bring it with him.  Pt denies fever, chills, nausea, vomiting. Has follow up with IR this week. Pt is well appearing, no tenderness on abdominal exam. no CVAT, no surrounding erythema, edema or discharge around drain in RLQ. PT with open drain in RLQ - will contact surgery team and IR to figure out if needs new bulb, new drain or other.

## 2022-03-28 NOTE — ED PROVIDER NOTE - PHYSICAL EXAMINATION
Physical Exam:  General: NAD, Conversive  Eyes: EOMI, Conjunctiva and sclera clear  Neck: No JVD  Lungs: Clear to auscultation bilaterally, no wheeze, no rhonchi  Heart: Normal S1, S2, no murmurs  Abdomen: Soft, nontender, nondistended,  no surrounding erythema, purulence, tenderness surrounding drain.  Extremities: 2+ peripheral pulses, no edema  Psych: AAO X3  Neurologic: Non-focal

## 2022-04-07 ENCOUNTER — TRANSCRIPTION ENCOUNTER (OUTPATIENT)
Age: 62
End: 2022-04-07

## 2022-04-07 ENCOUNTER — RESULT REVIEW (OUTPATIENT)
Age: 62
End: 2022-04-07

## 2022-04-07 ENCOUNTER — OUTPATIENT (OUTPATIENT)
Dept: OUTPATIENT SERVICES | Facility: HOSPITAL | Age: 62
LOS: 1 days | End: 2022-04-07
Payer: MEDICAID

## 2022-04-07 VITALS
TEMPERATURE: 98 F | HEART RATE: 82 BPM | RESPIRATION RATE: 17 BRPM | WEIGHT: 190.04 LBS | SYSTOLIC BLOOD PRESSURE: 158 MMHG | OXYGEN SATURATION: 100 % | DIASTOLIC BLOOD PRESSURE: 71 MMHG | HEIGHT: 71 IN

## 2022-04-07 DIAGNOSIS — Z90.49 ACQUIRED ABSENCE OF OTHER SPECIFIED PARTS OF DIGESTIVE TRACT: Chronic | ICD-10-CM

## 2022-04-07 DIAGNOSIS — K35.32 ACUTE APPENDICITIS WITH PERFORATION, LOCALIZED PERITONITIS, AND GANGRENE, WITHOUT ABSCESS: ICD-10-CM

## 2022-04-07 LAB — SARS-COV-2 RNA SPEC QL NAA+PROBE: SIGNIFICANT CHANGE UP

## 2022-04-07 PROCEDURE — 49424 ASSESS CYST CONTRAST INJECT: CPT

## 2022-04-07 PROCEDURE — U0003: CPT

## 2022-04-07 PROCEDURE — 76080 X-RAY EXAM OF FISTULA: CPT

## 2022-04-07 PROCEDURE — 76080 X-RAY EXAM OF FISTULA: CPT | Mod: 26

## 2022-04-07 RX ORDER — INSULIN LISPRO 100/ML
10 VIAL (ML) SUBCUTANEOUS
Qty: 0 | Refills: 0 | DISCHARGE

## 2022-04-07 RX ORDER — INSULIN GLARGINE 100 [IU]/ML
30 INJECTION, SOLUTION SUBCUTANEOUS
Qty: 0 | Refills: 0 | DISCHARGE

## 2022-04-07 RX ORDER — GABAPENTIN 400 MG/1
1 CAPSULE ORAL
Qty: 0 | Refills: 0 | DISCHARGE

## 2022-04-07 RX ORDER — MELOXICAM 15 MG/1
1 TABLET ORAL
Qty: 0 | Refills: 0 | DISCHARGE

## 2022-04-07 RX ORDER — ASPIRIN/CALCIUM CARB/MAGNESIUM 324 MG
1 TABLET ORAL
Qty: 0 | Refills: 0 | DISCHARGE

## 2022-04-07 RX ORDER — LOSARTAN POTASSIUM 100 MG/1
1 TABLET, FILM COATED ORAL
Qty: 0 | Refills: 0 | DISCHARGE

## 2022-04-07 RX ORDER — GABAPENTIN 400 MG/1
2 CAPSULE ORAL
Qty: 0 | Refills: 0 | DISCHARGE

## 2022-04-07 NOTE — ASU DISCHARGE PLAN (ADULT/PEDIATRIC) - NURSING INSTRUCTIONS
Please feel free to contact us at [phone] if any problems arise. After 6PM, Monday through Friday, on weekends and on holidays, please call [phone] and ask for the radiology resident on call to be paged.

## 2022-04-07 NOTE — ASU PATIENT PROFILE, ADULT - FALL HARM RISK - UNIVERSAL INTERVENTIONS
Bed in lowest position, wheels locked, appropriate side rails in place/Call bell, personal items and telephone in reach/Instruct patient to call for assistance before getting out of bed or chair/Non-slip footwear when patient is out of bed/[place] to call system/Physically safe environment - no spills, clutter or unnecessary equipment/Purposeful Proactive Rounding/Room/bathroom lighting operational, light cord in reach

## 2022-04-07 NOTE — ASU DISCHARGE PLAN (ADULT/PEDIATRIC) - ASU DC SPECIAL INSTRUCTIONSFT
Drain Check    Discharge Instructions  - You have had a drain checked.  - Keep the area clean and dry.  - Do not soak in a tub or pool with the drain, however you may shower with the drain and dressing covered in plastic wrap.  - Do not put traction on the drain and be careful that the drain does not get accidentally dislodged or kinked.  - Record output daily from the drain. Empty the bag as needed.  - You may resume your normal diet.  - You may resume your normal medications.  - It is normal to experience some pain over the site for the next few days. You may take apply ice to the area (20 minutes on, 20 minutes off) and take Tylenol for that pain. Do not take more frequently than every 6 hours and do not exceed more than 3000mg of Tylenol in a 24 hour period.    Notify your primary physician and/or Interventional Radiology IMMEDIATELY if you experience any of the following       - Fever of 101F or 38C       - Chills or Rigors/ Shakes       - Swelling and/or Redness in the area of the puncture site       - Worsening Pain       - Blood soaked bandages or worsening bleeding       - Lightheadedness and/or dizziness upon standing       - Chest Pain/ Tightness       - Shortness of Breath       - Difficulty walking    If you have a problem that you believe requires IMMEDIATE attention, please go to your NEAREST Emergency Room. If you believe your problem can safely wait until you speak to a physician, please call Interventional Radiology for any concerns.    Please feel free to contact us at [phone] if any problems arise. After 6PM, Monday through Friday, on weekends and on holidays, please call [phone] and ask for the radiology resident on call to be paged.

## 2022-04-07 NOTE — PRE PROCEDURE NOTE - PRE PROCEDURE EVALUATION
Interventional Radiology    HPI:62 year old male with hx of perforated appendicitis s/p abscess drain placement 12/3. 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 Pt is back today for abscess drain check. Denies output from the drain, leaking, fever, chills, abdominal pain. F/u'd up with Dr. Barbosa on 2/22, planning for surgery if the fistula doesn't close.          Allergies:   Medications (Abx/Cardiac/Anticoagulation/Blood Products)      Data:    T(C): --  HR: --  BP: --  RR: --  SpO2: --    Acute appendicitis with perforation and localized peritonitis, without abscess    H/O insulin dependent diabetes mellitus    History of laparoscopic cholecystectomy    ACUTE APPENDICITIS WITH PERF A    SysAdmin_VstLnk        Exam  General: No acute distress  Chest: Non labored breathing          Imaging:     Plan: 62y Male presents for drainage catheter evaluation possible removal/ revision/ replacement.   -Risks/Benefits/alternatives explained with the patient and witnessed informed consent obtained.    Interventional Radiology    HPI:62 year old male with hx of perforated appendicitis s/p abscess drain placement 12/3. 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 Pt is back today for abscess drain check. Denies output from the drain, leaking, fever, chills, abdominal pain. F/u'd up with Dr. Barbosa on 2/22, planning for surgery if the fistula doesn't close. Reports foul smelling drain. Minimal out put. denies fever, chills, abdominal pain.          Allergies:   Medications (Abx/Cardiac/Anticoagulation/Blood Products)      Data:    T(C): --  HR: --  BP: --  RR: --  SpO2: --    Acute appendicitis with perforation and localized peritonitis, without abscess    H/O insulin dependent diabetes mellitus    History of laparoscopic cholecystectomy    ACUTE APPENDICITIS WITH PERF A    SysAdmin_VstLnk        Exam  General: No acute distress  Chest: Non labored breathing          Imaging:     Plan: 62y Male presents for drainage catheter evaluation possible removal/ revision/ replacement.   -Risks/Benefits/alternatives explained with the patient and witnessed informed consent obtained.

## 2022-04-07 NOTE — ASU DISCHARGE PLAN (ADULT/PEDIATRIC) - NS MD DC FALL RISK RISK
For information on Fall & Injury Prevention, visit: [email or web address] OR  [email or web address] OR  https://www.cdc.gov/steadi/patient.html

## 2022-04-17 ENCOUNTER — APPOINTMENT (OUTPATIENT)
Dept: CT IMAGING | Facility: IMAGING CENTER | Age: 62
End: 2022-04-17

## 2022-04-18 ENCOUNTER — NON-APPOINTMENT (OUTPATIENT)
Age: 62
End: 2022-04-18

## 2022-04-18 ENCOUNTER — APPOINTMENT (OUTPATIENT)
Dept: GASTROENTEROLOGY | Facility: CLINIC | Age: 62
End: 2022-04-18
Payer: MEDICAID

## 2022-04-18 VITALS
BODY MASS INDEX: 27.35 KG/M2 | TEMPERATURE: 97.6 F | HEART RATE: 92 BPM | DIASTOLIC BLOOD PRESSURE: 79 MMHG | WEIGHT: 191 LBS | OXYGEN SATURATION: 98 % | HEIGHT: 70 IN | SYSTOLIC BLOOD PRESSURE: 135 MMHG

## 2022-04-18 DIAGNOSIS — K35.32 ACUTE APPENDICITIS W/ PERFORATION AND LOCALIZED PERITONITIS, W/O ABSCESS: ICD-10-CM

## 2022-04-18 DIAGNOSIS — K65.1 PERITONEAL ABSCESS: ICD-10-CM

## 2022-04-18 DIAGNOSIS — Z12.11 ENCOUNTER FOR SCREENING FOR MALIGNANT NEOPLASM OF COLON: ICD-10-CM

## 2022-04-18 PROCEDURE — 99204 OFFICE O/P NEW MOD 45 MIN: CPT

## 2022-04-18 NOTE — HISTORY OF PRESENT ILLNESS
[Heartburn] : denies heartburn [Vomiting] : denies vomiting [Nausea] : denies nausea [Diarrhea] : denies diarrhea [Constipation] : denies constipation [Yellow Skin Or Eyes (Jaundice)] : denies jaundice [Abdominal Swelling] : denies abdominal swelling [Rectal Pain] : denies rectal pain [Abdominal Pain] : abdominal pain [Alcohol Abuse] : alcohol abuse [Abdominal Surgery] : abdominal surgery [Wt Gain ___ Lbs] : no recent weight gain [Wt Loss ___ Lbs] : no recent weight loss [GERD] : no gastroesophageal reflux disease [Hiatus Hernia] : no hiatus hernia [Peptic Ulcer Disease] : no peptic ulcer disease [Pancreatitis] : no pancreatitis [Cholelithiasis] : no cholelithiasis [Kidney Stone] : no kidney stone [Inflammatory Bowel Disease] : no inflammatory bowel disease [Irritable Bowel Syndrome] : no irritable bowel syndrome [Diverticulitis] : no diverticulitis [Malignancy] : no malignancy [Appendectomy] : no appendectomy [Cholecystectomy] : no cholecystectomy [de-identified] : GLENN ALLEN 62 year M with perforated appendicitis as demonstrated on a CT abdomen, poorly controlled diabetes (reports A1C 8+), on insulin, with diabetic neuropathy, active smoker (1/2ppd) and active crack cocaine use here for a colonoscopy. 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 Fistula is unlikely to close spontaneously  and underwent surgical intervention, most likely ileocecectomy. \par Surgery would like to schedule a colonoscopy, which should be completed prior to surgery to rule out malignancy as the source of perforation. A repeat CT to evaluate collection was schedule but patient did not attend this appointment. Pt will need medical and cardia clearance, and he was advised he will need to stop smoking for 2-3 weeks prior to surgery. \par \par FH: No 1st degree or 2nd degree relative with colon cancer, gastric cancer or pancreatic cancer.\par

## 2022-04-18 NOTE — ASSESSMENT
[FreeTextEntry1] : GLENN ALLEN 62 year M with perforated appendicitis as demonstrated on a CT abdomen, poorly controlled diabetes (reports A1C 8+), on insulin, with diabetic neuropathy, active smoker (1/2ppd) and active crack cocaine use here for a colonoscopy. \par \par 1. Perforated appendicitis and colon cancer screening\par - I am unable to assess if his fistula has sufficiently closed to schedule a colonoscopy in the absence of recent CT scan.\par - I will await CT abdomen, in the absence of free air then will schedule a colonoscopy after PST.\par - If free air or fistula will D/W surgery if a colonoscopy could be performed in OR prior to surgery.\par \par Follow up left open.

## 2022-04-18 NOTE — ADDENDUM
[FreeTextEntry1] : The risks and benefits of my recommendations, as well as other treatment options were discussed with the patient today. Questions were answered.\par \par Please feel free to contact for any questions or concerns at my office  in the telephone numbers listed below.\par \par [address], [address], [place], [place], [address] Telephone: [phone] Fax: [phone]\par \par \par

## 2022-04-18 NOTE — CONSULT LETTER
[Dear  ___] : Dear  [unfilled], [Consult Letter:] : I had the pleasure of evaluating your patient, [unfilled]. [Please see my note below.] : Please see my note below. [Consult Closing:] : Thank you very much for allowing me to participate in the care of this patient.  If you have any questions, please do not hesitate to contact me. [Sincerely,] : Sincerely, [FreeTextEntry3] : Amairani Dodge MD\par \par Assistant Clinical Professor \par Division of Gastroenterology at [facility]\par [facility]\par [facility] at [facility]\par [facility]\par \par [address], [address], [place] [address]\par Tel: [phone] | Fax: [phone]\par \par Twitter (Personal): @Teena \par \par \par

## 2022-04-21 DIAGNOSIS — K35.33 ACUTE APPENDICITIS WITH PERFORATION, LOCALIZED PERITONITIS, AND GANGRENE, WITH ABSCESS: ICD-10-CM

## 2022-04-21 DIAGNOSIS — Z46.82 ENCOUNTER FOR FITTING AND ADJUSTMENT OF NON-VASCULAR CATHETER: ICD-10-CM

## 2022-04-24 ENCOUNTER — APPOINTMENT (OUTPATIENT)
Dept: CT IMAGING | Facility: IMAGING CENTER | Age: 62
End: 2022-04-24
Payer: MEDICAID

## 2022-04-24 ENCOUNTER — OUTPATIENT (OUTPATIENT)
Dept: OUTPATIENT SERVICES | Facility: HOSPITAL | Age: 62
LOS: 1 days | End: 2022-04-24
Payer: MEDICAID

## 2022-04-24 DIAGNOSIS — K65.1 PERITONEAL ABSCESS: ICD-10-CM

## 2022-04-24 DIAGNOSIS — Z90.49 ACQUIRED ABSENCE OF OTHER SPECIFIED PARTS OF DIGESTIVE TRACT: Chronic | ICD-10-CM

## 2022-04-24 PROCEDURE — 74177 CT ABD & PELVIS W/CONTRAST: CPT

## 2022-04-24 PROCEDURE — 74177 CT ABD & PELVIS W/CONTRAST: CPT | Mod: 26

## 2022-04-27 ENCOUNTER — NON-APPOINTMENT (OUTPATIENT)
Age: 62
End: 2022-04-27

## 2022-05-12 ENCOUNTER — OUTPATIENT (OUTPATIENT)
Dept: OUTPATIENT SERVICES | Facility: HOSPITAL | Age: 62
LOS: 1 days | End: 2022-05-12
Payer: MEDICAID

## 2022-05-12 VITALS
OXYGEN SATURATION: 99 % | DIASTOLIC BLOOD PRESSURE: 80 MMHG | WEIGHT: 175.05 LBS | HEART RATE: 98 BPM | RESPIRATION RATE: 18 BRPM | TEMPERATURE: 96 F | SYSTOLIC BLOOD PRESSURE: 147 MMHG | HEIGHT: 70 IN

## 2022-05-12 DIAGNOSIS — Z86.39 PERSONAL HISTORY OF OTHER ENDOCRINE, NUTRITIONAL AND METABOLIC DISEASE: ICD-10-CM

## 2022-05-12 DIAGNOSIS — K63.2 FISTULA OF INTESTINE: ICD-10-CM

## 2022-05-12 DIAGNOSIS — Z90.49 ACQUIRED ABSENCE OF OTHER SPECIFIED PARTS OF DIGESTIVE TRACT: Chronic | ICD-10-CM

## 2022-05-12 DIAGNOSIS — K35.32 ACUTE APPENDICITIS WITH PERFORATION, LOCALIZED PERITONITIS, AND GANGRENE, WITHOUT ABSCESS: ICD-10-CM

## 2022-05-12 DIAGNOSIS — F14.90 COCAINE USE, UNSPECIFIED, UNCOMPLICATED: ICD-10-CM

## 2022-05-12 DIAGNOSIS — I10 ESSENTIAL (PRIMARY) HYPERTENSION: ICD-10-CM

## 2022-05-12 DIAGNOSIS — G47.33 OBSTRUCTIVE SLEEP APNEA (ADULT) (PEDIATRIC): ICD-10-CM

## 2022-05-12 DIAGNOSIS — Z98.890 OTHER SPECIFIED POSTPROCEDURAL STATES: Chronic | ICD-10-CM

## 2022-05-12 LAB
ANION GAP SERPL CALC-SCNC: 8 MMOL/L — SIGNIFICANT CHANGE UP (ref 7–14)
BLD GP AB SCN SERPL QL: NEGATIVE — SIGNIFICANT CHANGE UP
BUN SERPL-MCNC: 23 MG/DL — SIGNIFICANT CHANGE UP (ref 7–23)
CALCIUM SERPL-MCNC: 9.5 MG/DL — SIGNIFICANT CHANGE UP (ref 8.4–10.5)
CHLORIDE SERPL-SCNC: 102 MMOL/L — SIGNIFICANT CHANGE UP (ref 98–107)
CO2 SERPL-SCNC: 29 MMOL/L — SIGNIFICANT CHANGE UP (ref 22–31)
CREAT SERPL-MCNC: 1.04 MG/DL — SIGNIFICANT CHANGE UP (ref 0.5–1.3)
EGFR: 82 ML/MIN/1.73M2 — SIGNIFICANT CHANGE UP
GLUCOSE SERPL-MCNC: 68 MG/DL — LOW (ref 70–99)
POTASSIUM SERPL-MCNC: 3.9 MMOL/L — SIGNIFICANT CHANGE UP (ref 3.5–5.3)
POTASSIUM SERPL-SCNC: 3.9 MMOL/L — SIGNIFICANT CHANGE UP (ref 3.5–5.3)
RH IG SCN BLD-IMP: POSITIVE — SIGNIFICANT CHANGE UP
SODIUM SERPL-SCNC: 139 MMOL/L — SIGNIFICANT CHANGE UP (ref 135–145)

## 2022-05-12 PROCEDURE — 93010 ELECTROCARDIOGRAM REPORT: CPT

## 2022-05-12 NOTE — H&P PST ADULT - GASTROINTESTINAL COMMENTS
Administrations This Visit     triamcinolone acetonide (KENALOG-40) injection 40 mg     Admin Date  05/05/2022  10:09 Action  Given Dose  40 mg Route  IntraMUSCular Site  Knee Left Administered By  Estefania Paula MA    Ordering Provider: SOCRATES Craig CNP    NDC: 9418-6573-12    Lot#: [id number]    : [employer] (PRIMARY CARE)    Patient Supplied?: No    Comments: DLYI:  PDFJ knee  NDC#:   0481-4911-41  LOT#:  [id number]  EXP: 3/1/2023  VERIFIED BY:zoila RLQ percutaneous drain C/D/I

## 2022-05-12 NOTE — H&P PST ADULT - PROBLEM SELECTOR PLAN 4
Pending Cardiac eval as per surgeon request-copy requested by PST NP for history of crack cocaine use.

## 2022-05-12 NOTE — H&P PST ADULT - WEIGHT IN KG
Cardiology Clinic Note  Reason for Visit: Follow up    HPI:   Mr. Houston Jc is a 74 y.o. gentleman with history of HTN, Non ischemic Cardiomyopathy, chronic systolic and diastolic HF (EF 30% grade 3 diastolic dysfunction), complete heart block s/p CRT-D placement 7/18/2019, LUE DVT, and DMII who presents for follow up. When the patient was seen 9/25/2019, his diuretic regimen was changed to torsemide 40 mg daily, but patient taking torsemide 40 mg BID. When the patient was seen in 11/2019, discussed whether to start Entresto but given elevated Cr (3.4 on repeat BMP) held off.     He reports improvement in his breathing, denies any PND, sleeps on 2 pillows for comfort. Short of breath when walking from parking lot to office but did not need to stop, which is improvement from prior. Weight was previously 340lbs, but 310s-320s, today 321. Weighs himself daily. Has required one extra dose of Torsemide this past month.      ROS:    Review of Systems   Constitution: Negative for decreased appetite, fever, weight gain and weight loss.   HENT: Negative for hoarse voice and nosebleeds.    Eyes: Negative for blurred vision and photophobia.   Cardiovascular: Positive for dyspnea on exertion. Negative for chest pain, irregular heartbeat, orthopnea and palpitations.   Respiratory: Negative for cough and shortness of breath.    Hematologic/Lymphatic: Negative for bleeding problem. Does not bruise/bleed easily.   Skin: Negative for itching and rash.   Musculoskeletal: Negative for joint swelling and neck pain.   Gastrointestinal: Negative for abdominal pain, hematemesis, hematochezia, nausea and vomiting.   Genitourinary: Negative for hematuria.   Neurological: Negative for light-headedness and seizures.   Psychiatric/Behavioral: Negative for altered mental status. The patient is not nervous/anxious.      PMH:     Past Medical History:   Diagnosis Date    Anemia     CHF (congestive heart failure)     CKD (chronic kidney  disease) stage 4, GFR 15-29 ml/min     Coronary artery disease     Diabetes mellitus     Hematuria     Hypertension     Renal cyst, right      Past Surgical History:   Procedure Laterality Date    INSERTION OF BIVENTRICULAR IMPLANTABLE CARDIOVERTER-DEFIBRILLATOR (ICD) Left 7/18/2019    Procedure: INSERTION, ICD, BIVENTRICULAR;  Surgeon: Arturo Bay MD;  Location: [facility] EP LAB;  Service: Cardiology;  Laterality: Left;  CHB, CRTD, SJM, anes, GP, 6078    LEFT HEART CATHETERIZATION N/A 7/16/2019    Procedure: Left heart cath;  Surgeon: Dejuan Cody MD;  Location: [facility] CATH LAB/EP;  Service: Cardiology;  Laterality: N/A;     Allergies:   Review of patient's allergies indicates:  No Known Allergies  Medications:     Current Outpatient Medications on File Prior to Visit   Medication Sig Dispense Refill    allopurinol (ZYLOPRIM) 300 MG tablet Take 300 mg by mouth once daily.      alogliptin benzoate (ALOGLIPTIN ORAL) Take 25 mg by mouth once daily.      amiodarone (PACERONE) 200 MG Tab Take 1 tablet (200 mg total) by mouth 2 (two) times daily. 60 tablet 11    aspirin (ECOTRIN) 81 MG EC tablet Take 81 mg by mouth once daily.      atorvastatin (LIPITOR) 80 MG tablet Take 80 mg by mouth once daily.      carvedilol (COREG) 12.5 MG tablet Take 12.5 mg by mouth 2 (two) times daily.      cholecalciferol, vitamin D3, (VITAMIN D3) 2,000 unit Cap Take 1 capsule by mouth once daily.      finasteride (PROSCAR) 5 mg tablet Take 1 tablet (5 mg total) by mouth once daily. 30 tablet 11    insulin aspart U-100 (NOVOLOG) 100 unit/mL injection Inject into the skin 3 (three) times daily before meals. PUMP      losartan (COZAAR) 25 MG tablet Take 1 tablet (25 mg total) by mouth once daily. 90 tablet 3    magnesium oxide (MAG-OXIDE ORAL) Take 420 mg by mouth 2 (two) times daily.       potassium chloride (K-TAB) 20 mEq Take by mouth once daily.      sildenafil (VIAGRA) 100 MG tablet Take 100 mg by mouth once a week.    "   spironolactone (ALDACTONE) 12.5 MG tablet Take 12.5 mg by mouth once daily.      tamsulosin (FLOMAX) 0.4 mg Cap Take 1 capsule (0.4 mg total) by mouth once daily. 30 capsule 11    torsemide (DEMADEX) 20 MG Tab Take 2 tablets (40 mg total) by mouth once daily. Take additional 2 tablets if weight gain of more than 3 pounds in 1 day (Patient taking differently: Take 20 mg by mouth 2 (two) times daily. Take additional 2 tablets if weight gain of more than 3 pounds in 1 day) 60 tablet 11    VENTOLIN HFA 90 mcg/actuation inhaler Inhale 1 puff into the lungs every 4 (four) hours as needed.       walker Misc 5" wheel rolling walker 1 each 1     No current facility-administered medications on file prior to visit.      Social History:     Social History     Tobacco Use    Smoking status: Former Smoker    Smokeless tobacco: Never Used   Substance Use Topics    Alcohol use: Yes     Comment: social     Family History:     Family History   Problem Relation Age of Onset    Heart attack Father      Physical Exam:   BP (!) 149/67 (BP Location: Left arm, Patient Position: Sitting, BP Method: Large (Automatic))   Pulse 71   Ht 6' 1.5" (1.867 m)   Wt (!) 145.7 kg (321 lb 3.4 oz)   BMI 41.80 kg/m²      Physical Exam   Constitutional: He is oriented to person, place, and time. He appears well-developed and well-nourished.   Obese man   HENT:   Head: Normocephalic and atraumatic.   Eyes: Conjunctivae are normal.   Neck: Neck supple. No JVD present.   Cardiovascular: Normal rate, regular rhythm and intact distal pulses.   II/VI systolic ejection murmur best auscultated at the SB   Pulmonary/Chest: Effort normal and breath sounds normal.   Abdominal: Soft. Bowel sounds are normal.   Musculoskeletal: Normal range of motion.   1-2+ pitting edema bilaterally to knees   Neurological: He is alert and oriented to person, place, and time.   Skin: Skin is warm and dry.   Psychiatric: He has a normal mood and affect.     Labs: "     Lab Results   Component Value Date     12/18/2019    K 4.1 12/18/2019     12/18/2019    CO2 30 12/18/2019    BUN 59 (H) 12/18/2019    CREATININE 3.43 (H) 12/18/2019    ANIONGAP 9 09/25/2019     Lab Results   Component Value Date    HGBA1C 8.5 (H) 12/18/2019     Lab Results   Component Value Date    BNP 1,142 (H) 07/17/2019     (H) 09/18/2015    Lab Results   Component Value Date    WBC 6.6 12/18/2019    HGB 10.9 (L) 12/18/2019    HCT 33.3 (L) 12/18/2019     12/18/2019    GRAN 4.9 07/22/2019    GRAN 70.5 07/22/2019     Lab Results   Component Value Date    CHOL 82 (L) 07/13/2019    HDL 27 (L) 07/13/2019    LDLCALC 38.0 (L) 07/13/2019    TRIG 85 07/13/2019            Assessment/Plan   Mr. Houston Jc is a 74 y.o. gentleman with history of HTN, Non ischemic Cardiomyopathy, CHF (EF 30% grade 3 diastolic dysfunction), Complete heart block, CRT-D placement 7/18/2019, LUE DVT, and DM who presents for follow up, last seen in clinic 9/25/2019 when his  diuretic regimen was changed to Torsemide 40 mg BID -> torsemide 40 mg daily. Heart failure symptoms have improved and on exam no evidence of JVD with decreased lower extremity edema. However, the patient is not on GMDT.     1. Acute on chronic heart failure in the setting of NICM EF 30%, now s/p [facility] [place] CRT-D on 07/18/19  Nonischemic cardiomyopathy  On torsemide 40 mg BID  On ARB, spironolactone and Coreg -> will need to change ARB to Entresto if Cr improves (<3). Will discuss with nephrologist   Diet and fluid restriction emphasized  Daily weights    2. CKD 4  Cr now increased to > 3. Likely representative of worsening CKD   - Follows up with Nephrology  - Discuss with nephrology about Entresto, diuretic dosage    3. History of VT -- controlled on amio  Follows up with EP  - Continue amiodarone 200mg BID  - TTE follow-up at next EP visit     4. Essential HTN  BP at goal today (<130/80), but patient not on GDMT  - Continue Coreg 12.5 mg BID,  losartan 25 mg daily today  - Discuss with nephrologist regarding Entresto    Discussed and staffed with Dr. Choi.    Follow up in 2 months    Aimee Arroyo MD  Cardiology - PGY4  Pager [phone]     79.4

## 2022-05-12 NOTE — H&P PST ADULT - ASSESSMENT
61 yo male presents to PST unit with pre-op diagnosis of fistula of intestine, history of perforated appendicitis scheduled for laparoscopic appendectomy, possible ileocectomy, possible open with Dr. Barbosa.

## 2022-05-12 NOTE — H&P PST ADULT - PROBLEM SELECTOR PLAN 6
no back pain, no musculoskeletal pain, no neck pain, and no weakness.
Pt. instructed to continue medications as prescribed.

## 2022-05-12 NOTE — H&P PST ADULT - NEUROLOGICAL COMMENTS
"hypersensitivity/nerve pain of chest, abdomen and back "hypersensitivity/nerve pain of chest, abdomen and back"

## 2022-05-12 NOTE — H&P PST ADULT - NSICDXPASTMEDICALHX_GEN_ALL_CORE_FT
PAST MEDICAL HISTORY:  H/O insulin dependent diabetes mellitus      PAST MEDICAL HISTORY:  Crack cocaine use     H/O insulin dependent diabetes mellitus     History of diabetic neuropathy     HTN (hypertension)     Perforated appendicitis

## 2022-05-12 NOTE — H&P PST ADULT - PROBLEM SELECTOR PLAN 1
scheduled for laparoscopic appendectomy, possible ileocectomy, possible open with Dr. Barbosa on 5/18/2022.  Verbal and written pre-op instructions provided to patient. Patient verbalized understanding and will call surgeons office for revised instructions if surgery is rescheduled.   Pepcid for GI prophylaxis provided.   patient given verbal and written instruction with teach back on chlorhexidine shampoo, and the patient verbalized understanding with return demonstration.   Patient aware of need for COVID testing prior to  procedure at a [facility] facility  and advised to coordinate with surgeon to get tested within 72 hours of procedure.

## 2022-05-12 NOTE — H&P PST ADULT - PROBLEM SELECTOR PLAN 2
Pt. instructed to hold Admelog morning of procedure. Accucheck DOS.   Reduce Basaglar to 25 units night before surgery.  Patient will obtain medical clearance as per surgeons request-copy requested for history of poorly controlled DM2.

## 2022-05-12 NOTE — H&P PST ADULT - HISTORY OF PRESENT ILLNESS
63 yo male presents to PST unit with pre-op diagnosis of fistula of intestine, history of perforated appendicitis scheduled for laparoscopic appendectomy, possible ileocectomy, possible open with Dr. Barbosa.

## 2022-05-12 NOTE — H&P PST ADULT - NSICDXPASTSURGICALHX_GEN_ALL_CORE_FT
PAST SURGICAL HISTORY:  History of laparoscopic cholecystectomy      PAST SURGICAL HISTORY:  H/O drainage of abscess IR drain 12/3/21    History of laparoscopic cholecystectomy

## 2022-05-17 NOTE — ASU PATIENT PROFILE, ADULT - NSICDXPASTMEDICALHX_GEN_ALL_CORE_FT
PAST MEDICAL HISTORY:  Crack cocaine use     H/O insulin dependent diabetes mellitus     History of diabetic neuropathy     HTN (hypertension)     Perforated appendicitis

## 2022-05-17 NOTE — ASU PATIENT PROFILE, ADULT - NSICDXPASTSURGICALHX_GEN_ALL_CORE_FT
PAST SURGICAL HISTORY:  H/O drainage of abscess IR drain 12/3/21    History of laparoscopic cholecystectomy

## 2022-05-18 ENCOUNTER — APPOINTMENT (OUTPATIENT)
Dept: TRAUMA SURGERY | Facility: HOSPITAL | Age: 62
End: 2022-05-18

## 2022-06-06 RX ORDER — NEOMYCIN SULFATE 500 MG/1
500 TABLET ORAL
Qty: 3 | Refills: 0 | Status: ACTIVE | COMMUNITY
Start: 2022-05-03 | End: 1900-01-01

## 2022-06-06 RX ORDER — METRONIDAZOLE 250 MG/1
250 TABLET ORAL
Qty: 3 | Refills: 0 | Status: ACTIVE | COMMUNITY
Start: 2022-05-03 | End: 1900-01-01

## 2022-06-07 ENCOUNTER — TRANSCRIPTION ENCOUNTER (OUTPATIENT)
Age: 62
End: 2022-06-07

## 2022-06-08 ENCOUNTER — TRANSCRIPTION ENCOUNTER (OUTPATIENT)
Age: 62
End: 2022-06-08

## 2022-06-08 ENCOUNTER — RESULT REVIEW (OUTPATIENT)
Age: 62
End: 2022-06-08

## 2022-06-08 ENCOUNTER — INPATIENT (INPATIENT)
Facility: HOSPITAL | Age: 62
LOS: 0 days | Discharge: HOME | End: 2022-06-08
Attending: STUDENT IN AN ORGANIZED HEALTH CARE EDUCATION/TRAINING PROGRAM | Admitting: STUDENT IN AN ORGANIZED HEALTH CARE EDUCATION/TRAINING PROGRAM
Payer: MEDICAID

## 2022-06-08 ENCOUNTER — APPOINTMENT (OUTPATIENT)
Dept: TRAUMA SURGERY | Facility: HOSPITAL | Age: 62
End: 2022-06-08

## 2022-06-08 VITALS — OXYGEN SATURATION: 97 % | HEART RATE: 97 BPM | RESPIRATION RATE: 18 BRPM

## 2022-06-08 VITALS
HEART RATE: 92 BPM | RESPIRATION RATE: 18 BRPM | SYSTOLIC BLOOD PRESSURE: 134 MMHG | DIASTOLIC BLOOD PRESSURE: 77 MMHG | HEIGHT: 70 IN | TEMPERATURE: 98 F | OXYGEN SATURATION: 99 % | WEIGHT: 175.05 LBS

## 2022-06-08 DIAGNOSIS — Z98.890 OTHER SPECIFIED POSTPROCEDURAL STATES: Chronic | ICD-10-CM

## 2022-06-08 DIAGNOSIS — K35.32 ACUTE APPENDICITIS WITH PERFORATION, LOCALIZED PERITONITIS, AND GANGRENE, WITHOUT ABSCESS: ICD-10-CM

## 2022-06-08 DIAGNOSIS — Z90.49 ACQUIRED ABSENCE OF OTHER SPECIFIED PARTS OF DIGESTIVE TRACT: Chronic | ICD-10-CM

## 2022-06-08 PROBLEM — F14.90 COCAINE USE, UNSPECIFIED, UNCOMPLICATED: Chronic | Status: ACTIVE | Noted: 2022-05-12

## 2022-06-08 PROBLEM — Z86.39 PERSONAL HISTORY OF OTHER ENDOCRINE, NUTRITIONAL AND METABOLIC DISEASE: Chronic | Status: ACTIVE | Noted: 2022-05-12

## 2022-06-08 PROBLEM — I10 ESSENTIAL (PRIMARY) HYPERTENSION: Chronic | Status: ACTIVE | Noted: 2022-05-12

## 2022-06-08 LAB
GLUCOSE BLDC GLUCOMTR-MCNC: 231 MG/DL — HIGH (ref 70–99)
GLUCOSE BLDC GLUCOMTR-MCNC: 260 MG/DL — HIGH (ref 70–99)

## 2022-06-08 PROCEDURE — 44640 REPAIR BOWEL-SKIN FISTULA: CPT | Mod: GC

## 2022-06-08 PROCEDURE — 88304 TISSUE EXAM BY PATHOLOGIST: CPT | Mod: 26

## 2022-06-08 DEVICE — CLIP APPLIER COVIDIEN ENDOCLIP III 5MM: Type: IMPLANTABLE DEVICE | Status: FUNCTIONAL

## 2022-06-08 DEVICE — STAPLER COVIDIEN TRI-STAPLE 45MM PURPLE RELOAD: Type: IMPLANTABLE DEVICE | Status: FUNCTIONAL

## 2022-06-08 DEVICE — STAPLER COVIDIEN TRI-STAPLE 60MM PURPLE RELOAD: Type: IMPLANTABLE DEVICE | Status: FUNCTIONAL

## 2022-06-08 RX ORDER — ACETAMINOPHEN 500 MG
650 TABLET ORAL EVERY 6 HOURS
Refills: 0 | Status: DISCONTINUED | OUTPATIENT
Start: 2022-06-08 | End: 2022-06-08

## 2022-06-08 RX ORDER — ASPIRIN/CALCIUM CARB/MAGNESIUM 324 MG
1 TABLET ORAL
Qty: 0 | Refills: 0 | DISCHARGE

## 2022-06-08 RX ORDER — ONDANSETRON 8 MG/1
4 TABLET, FILM COATED ORAL ONCE
Refills: 0 | Status: DISCONTINUED | OUTPATIENT
Start: 2022-06-08 | End: 2022-06-08

## 2022-06-08 RX ORDER — MELOXICAM 15 MG/1
1 TABLET ORAL
Qty: 0 | Refills: 0 | DISCHARGE

## 2022-06-08 RX ORDER — OXYCODONE HYDROCHLORIDE 5 MG/1
1 TABLET ORAL
Qty: 15 | Refills: 0
Start: 2022-06-08

## 2022-06-08 RX ORDER — ATORVASTATIN CALCIUM 80 MG/1
1 TABLET, FILM COATED ORAL
Qty: 0 | Refills: 0 | DISCHARGE

## 2022-06-08 RX ORDER — INSULIN GLARGINE 100 [IU]/ML
32 INJECTION, SOLUTION SUBCUTANEOUS
Qty: 0 | Refills: 0 | DISCHARGE

## 2022-06-08 RX ORDER — INSULIN LISPRO 100/ML
10 VIAL (ML) SUBCUTANEOUS
Qty: 0 | Refills: 0 | DISCHARGE

## 2022-06-08 RX ORDER — CELECOXIB 200 MG/1
400 CAPSULE ORAL ONCE
Refills: 0 | Status: COMPLETED | OUTPATIENT
Start: 2022-06-08 | End: 2022-06-08

## 2022-06-08 RX ORDER — GABAPENTIN 400 MG/1
600 CAPSULE ORAL ONCE
Refills: 0 | Status: COMPLETED | OUTPATIENT
Start: 2022-06-08 | End: 2022-06-08

## 2022-06-08 RX ORDER — LOSARTAN POTASSIUM 100 MG/1
1 TABLET, FILM COATED ORAL
Qty: 0 | Refills: 0 | DISCHARGE

## 2022-06-08 RX ORDER — HYDROMORPHONE HYDROCHLORIDE 2 MG/ML
0.5 INJECTION INTRAMUSCULAR; INTRAVENOUS; SUBCUTANEOUS
Refills: 0 | Status: DISCONTINUED | OUTPATIENT
Start: 2022-06-08 | End: 2022-06-08

## 2022-06-08 RX ORDER — HYDROMORPHONE HYDROCHLORIDE 2 MG/ML
1 INJECTION INTRAMUSCULAR; INTRAVENOUS; SUBCUTANEOUS
Refills: 0 | Status: DISCONTINUED | OUTPATIENT
Start: 2022-06-08 | End: 2022-06-08

## 2022-06-08 RX ORDER — GABAPENTIN 400 MG/1
1 CAPSULE ORAL
Qty: 0 | Refills: 0 | DISCHARGE

## 2022-06-08 RX ORDER — INSULIN LISPRO 100/ML
VIAL (ML) SUBCUTANEOUS
Refills: 0 | Status: DISCONTINUED | OUTPATIENT
Start: 2022-06-08 | End: 2022-06-08

## 2022-06-08 RX ADMIN — HYDROMORPHONE HYDROCHLORIDE 0.5 MILLIGRAM(S): 2 INJECTION INTRAMUSCULAR; INTRAVENOUS; SUBCUTANEOUS at 18:10

## 2022-06-08 RX ADMIN — Medication 2: at 17:51

## 2022-06-08 RX ADMIN — CELECOXIB 400 MILLIGRAM(S): 200 CAPSULE ORAL at 11:33

## 2022-06-08 RX ADMIN — HYDROMORPHONE HYDROCHLORIDE 0.5 MILLIGRAM(S): 2 INJECTION INTRAMUSCULAR; INTRAVENOUS; SUBCUTANEOUS at 18:00

## 2022-06-08 RX ADMIN — GABAPENTIN 600 MILLIGRAM(S): 400 CAPSULE ORAL at 11:32

## 2022-06-08 NOTE — BRIEF OPERATIVE NOTE - OPERATION/FINDINGS
Interval appendectomy for perforated appendicitis back from 12/2021 s/p IR drainage with tube checks revealing drain within cecum  3 port laparoscopic appendectomy   Diagnostic lap revealing tract between abdominal wall and cecum with drain palpated   Takedown of tract at the level of abdominal wall taken bluntly and with donny  Base of appendix and cuff of cecum taken with stapler x2  Hemostasis of staple line with clips  More out at end of case

## 2022-06-08 NOTE — ASU PREOP CHECKLIST - SELECT TESTS ORDERED
CBC/CMP/Type and Screen/Urinalysis/EKG/POCT Blood Glucose/COVID-19 260 FS/CBC/CMP/Type and Screen/Urinalysis/EKG/POCT Blood Glucose/COVID-19

## 2022-06-08 NOTE — ASU DISCHARGE PLAN (ADULT/PEDIATRIC) - ASU DC SPECIAL INSTRUCTIONSFT
BATHING: Please do not submerge wound underwater. You may shower and/or sponge bathe. You may shower. Pat Dry abdomen. Leave the white steri strips in place, they will fall off on their own in approximately 5-7 days.   ACTIVITY: No heavy lifting anything more than 10-15lbs or straining. Otherwise, you may return to your usual level of physical activity. If you are taking narcotic pain medication (such as Percocet), do NOT drive a car, operate machinery or make important decisions.  NOTIFY YOUR SURGEON IF: You have any bleeding that does not stop, any pus draining from your wound, any fever (over 100.4 F) or chills, persistent nausea/vomiting with inability to tolerate food or liquids, persistent diarrhea, or if your pain is not controlled on your discharge pain medications.  FOLLOW-UP:  1. Please call to make a follow-up appointment within one week of discharge, you may discuss when cleared to return to school/work at your follow up appointment with your surgeon.  2. Please follow up with your primary care physician in one week regarding your hospitalization.    You may take 2 regular (325mg each) or 2 extra strength (500mg each) acetaminophen (Tylenol) tablets every 6 hours for mild to moderate pain. You may also take 2-3 ibuprofen (Advil, Motrin) tablets (200mg each) every 6 hours for mild to moderate pain. You may alternate them so that you take acetaminophen or ibuprofen every 3 hours if needed. For severe pain not relieved by acetaminophen or ibuprofen, you may take 1 oxycodone tablet (prescription sent to your pharmacy) every 6 hours.

## 2022-06-08 NOTE — ASU DISCHARGE PLAN (ADULT/PEDIATRIC) - NS MD DC FALL RISK RISK
For information on Fall & Injury Prevention, visit: [email or web address] OR  [email or web address] OR  https://www.cdc.gov/steadi/patient.html

## 2022-06-08 NOTE — ASU DISCHARGE PLAN (ADULT/PEDIATRIC) - NURSING INSTRUCTIONS
DO NOT take any Tylenol (Acetaminophen) or narcotics containing Tylenol until after  _8:50PM_____ . You received Tylenol during your operation and it can cause damage to your liver if too much is taken within a 24 hour time period.     Do not scrub or rub incision while showering. Pat dry after shower. No cream, lotion, ointment on incisions unless directed by surgeon.

## 2022-06-08 NOTE — ASU PREOP CHECKLIST - 1.
PACU locker PACU locker 2 PACU locker 2 /// PT report to MARCO ANTONIO Muñoz RN at 0863 PACU locker 2 /// PT report to MARCO ANTONIO Muñoz RN at 1145. PT care resumed at 1215.

## 2022-06-23 LAB — SURGICAL PATHOLOGY STUDY: SIGNIFICANT CHANGE UP

## 2022-07-19 ENCOUNTER — APPOINTMENT (OUTPATIENT)
Dept: TRAUMA SURGERY | Facility: HOSPITAL | Age: 62
End: 2022-07-19

## 2023-01-31 NOTE — ASU PATIENT PROFILE, ADULT - PATIENT'S PREFERRED PRONOUN
[facility] Emergency Department  [address] [address]  Phone: [phone]               January 31, 2023    Patient: Ba Chance   YOB: 1972   Date of Visit: 1/31/2023       To Whom It May Concern:    Sierra Otto was seen and treated in our emergency department on 1/31/2023. He may return to work on 02/06/2023.       Sincerely,       Ruth Rubin RN         Signature:__________________________________ Him/He

## 2025-08-24 ENCOUNTER — EMERGENCY (EMERGENCY)
Facility: HOSPITAL | Age: 65
LOS: 1 days | End: 2025-08-24
Attending: STUDENT IN AN ORGANIZED HEALTH CARE EDUCATION/TRAINING PROGRAM | Admitting: STUDENT IN AN ORGANIZED HEALTH CARE EDUCATION/TRAINING PROGRAM
Payer: MEDICAID

## 2025-08-24 VITALS
HEART RATE: 79 BPM | DIASTOLIC BLOOD PRESSURE: 87 MMHG | SYSTOLIC BLOOD PRESSURE: 149 MMHG | OXYGEN SATURATION: 98 % | TEMPERATURE: 98 F | RESPIRATION RATE: 16 BRPM

## 2025-08-24 DIAGNOSIS — Z90.49 ACQUIRED ABSENCE OF OTHER SPECIFIED PARTS OF DIGESTIVE TRACT: Chronic | ICD-10-CM

## 2025-08-24 DIAGNOSIS — Z98.890 OTHER SPECIFIED POSTPROCEDURAL STATES: Chronic | ICD-10-CM

## 2025-08-24 LAB
ADD ON TEST-SPECIMEN IN LAB: SIGNIFICANT CHANGE UP
ALBUMIN SERPL ELPH-MCNC: 4.4 G/DL — SIGNIFICANT CHANGE UP (ref 3.3–5)
ALP SERPL-CCNC: 113 U/L — SIGNIFICANT CHANGE UP (ref 40–120)
ALT FLD-CCNC: 8 U/L — SIGNIFICANT CHANGE UP (ref 4–41)
ANION GAP SERPL CALC-SCNC: 11 MMOL/L — SIGNIFICANT CHANGE UP (ref 7–14)
AST SERPL-CCNC: 10 U/L — SIGNIFICANT CHANGE UP (ref 4–40)
B-OH-BUTYR SERPL-SCNC: <0 MMOL/L — SIGNIFICANT CHANGE UP (ref 0–0.4)
BASOPHILS # BLD AUTO: 0.04 K/UL — SIGNIFICANT CHANGE UP (ref 0–0.2)
BASOPHILS NFR BLD AUTO: 0.4 % — SIGNIFICANT CHANGE UP (ref 0–2)
BILIRUB SERPL-MCNC: 0.6 MG/DL — SIGNIFICANT CHANGE UP (ref 0.2–1.2)
BLOOD GAS VENOUS COMPREHENSIVE RESULT: SIGNIFICANT CHANGE UP
BUN SERPL-MCNC: 19 MG/DL — SIGNIFICANT CHANGE UP (ref 7–23)
CALCIUM SERPL-MCNC: 9.6 MG/DL — SIGNIFICANT CHANGE UP (ref 8.4–10.5)
CHLORIDE SERPL-SCNC: 99 MMOL/L — SIGNIFICANT CHANGE UP (ref 98–107)
CO2 SERPL-SCNC: 24 MMOL/L — SIGNIFICANT CHANGE UP (ref 22–31)
CREAT SERPL-MCNC: 0.91 MG/DL — SIGNIFICANT CHANGE UP (ref 0.5–1.3)
EGFR: 95 ML/MIN/1.73M2 — SIGNIFICANT CHANGE UP
EGFR: 95 ML/MIN/1.73M2 — SIGNIFICANT CHANGE UP
EOSINOPHIL # BLD AUTO: 0.08 K/UL — SIGNIFICANT CHANGE UP (ref 0–0.5)
EOSINOPHIL NFR BLD AUTO: 0.9 % — SIGNIFICANT CHANGE UP (ref 0–6)
GLUCOSE SERPL-MCNC: 279 MG/DL — HIGH (ref 70–99)
HCT VFR BLD CALC: 46.4 % — SIGNIFICANT CHANGE UP (ref 39–50)
HGB BLD-MCNC: 15.3 G/DL — SIGNIFICANT CHANGE UP (ref 13–17)
IMM GRANULOCYTES # BLD AUTO: 0.07 K/UL — SIGNIFICANT CHANGE UP (ref 0–0.07)
IMM GRANULOCYTES NFR BLD AUTO: 0.8 % — SIGNIFICANT CHANGE UP (ref 0–0.9)
LYMPHOCYTES # BLD AUTO: 1.63 K/UL — SIGNIFICANT CHANGE UP (ref 1–3.3)
LYMPHOCYTES NFR BLD AUTO: 17.7 % — SIGNIFICANT CHANGE UP (ref 13–44)
MCHC RBC-ENTMCNC: 28.8 PG — SIGNIFICANT CHANGE UP (ref 27–34)
MCHC RBC-ENTMCNC: 33 G/DL — SIGNIFICANT CHANGE UP (ref 32–36)
MCV RBC AUTO: 87.4 FL — SIGNIFICANT CHANGE UP (ref 80–100)
MONOCYTES # BLD AUTO: 0.59 K/UL — SIGNIFICANT CHANGE UP (ref 0–0.9)
MONOCYTES NFR BLD AUTO: 6.4 % — SIGNIFICANT CHANGE UP (ref 2–14)
NEUTROPHILS # BLD AUTO: 6.81 K/UL — SIGNIFICANT CHANGE UP (ref 1.8–7.4)
NEUTROPHILS NFR BLD AUTO: 73.8 % — SIGNIFICANT CHANGE UP (ref 43–77)
NRBC # BLD AUTO: 0 K/UL — SIGNIFICANT CHANGE UP (ref 0–0)
NRBC # FLD: 0 K/UL — SIGNIFICANT CHANGE UP (ref 0–0)
NRBC BLD AUTO-RTO: 0 /100 WBCS — SIGNIFICANT CHANGE UP (ref 0–0)
PLATELET # BLD AUTO: 200 K/UL — SIGNIFICANT CHANGE UP (ref 150–400)
PMV BLD: 9.7 FL — SIGNIFICANT CHANGE UP (ref 7–13)
POTASSIUM SERPL-MCNC: 4.5 MMOL/L — SIGNIFICANT CHANGE UP (ref 3.5–5.3)
POTASSIUM SERPL-SCNC: 4.5 MMOL/L — SIGNIFICANT CHANGE UP (ref 3.5–5.3)
PROT SERPL-MCNC: 7.1 G/DL — SIGNIFICANT CHANGE UP (ref 6–8.3)
RBC # BLD: 5.31 M/UL — SIGNIFICANT CHANGE UP (ref 4.2–5.8)
RBC # FLD: 12.4 % — SIGNIFICANT CHANGE UP (ref 10.3–14.5)
SODIUM SERPL-SCNC: 134 MMOL/L — LOW (ref 135–145)
TROPONIN T, HIGH SENSITIVITY RESULT: 7 NG/L — SIGNIFICANT CHANGE UP
WBC # BLD: 9.22 K/UL — SIGNIFICANT CHANGE UP (ref 3.8–10.5)
WBC # FLD AUTO: 9.22 K/UL — SIGNIFICANT CHANGE UP (ref 3.8–10.5)

## 2025-08-24 PROCEDURE — 99285 EMERGENCY DEPT VISIT HI MDM: CPT

## 2025-08-24 PROCEDURE — 73020 X-RAY EXAM OF SHOULDER: CPT | Mod: 26,RT,59

## 2025-08-24 PROCEDURE — 73030 X-RAY EXAM OF SHOULDER: CPT | Mod: 26,RT

## 2025-08-24 PROCEDURE — 93010 ELECTROCARDIOGRAM REPORT: CPT

## 2025-08-24 PROCEDURE — 73060 X-RAY EXAM OF HUMERUS: CPT | Mod: 26,RT

## 2025-08-24 RX ORDER — ACETAMINOPHEN 500 MG/5ML
1000 LIQUID (ML) ORAL ONCE
Refills: 0 | Status: COMPLETED | OUTPATIENT
Start: 2025-08-24 | End: 2025-08-24

## 2025-08-24 RX ORDER — ACETAMINOPHEN 500 MG/5ML
2 LIQUID (ML) ORAL
Qty: 56 | Refills: 0
Start: 2025-08-24 | End: 2025-08-30

## 2025-08-24 RX ORDER — SODIUM CHLORIDE 9 G/1000ML
1000 INJECTION, SOLUTION INTRAVENOUS ONCE
Refills: 0 | Status: COMPLETED | OUTPATIENT
Start: 2025-08-24 | End: 2025-08-24

## 2025-08-24 RX ORDER — IBUPROFEN 200 MG
400 TABLET ORAL ONCE
Refills: 0 | Status: COMPLETED | OUTPATIENT
Start: 2025-08-24 | End: 2025-08-24

## 2025-08-24 RX ORDER — NAPROXEN SODIUM 275 MG
1 TABLET ORAL
Qty: 20 | Refills: 0
Start: 2025-08-24 | End: 2025-09-02

## 2025-08-24 RX ADMIN — Medication 400 MILLIGRAM(S): at 13:23

## 2025-08-24 RX ADMIN — SODIUM CHLORIDE 1000 MILLILITER(S): 9 INJECTION, SOLUTION INTRAVENOUS at 13:25

## (undated) DEVICE — SUT VICRYL 0 27" UR-6

## (undated) DEVICE — TROCAR COVIDIEN VERSAPORT BLADELESS OPTICAL 5MM STANDARD

## (undated) DEVICE — POOLE SUCTION TIP

## (undated) DEVICE — SOL IRR POUR NS 0.9% 1500ML

## (undated) DEVICE — TUBING SUCTION NONCONDUCTIVE 6MM X 12FT

## (undated) DEVICE — DRSG TELFA 3 X 8

## (undated) DEVICE — D HELP - CLEARVIEW CLEARIFY SYSTEM

## (undated) DEVICE — NDL COUNTER FOAM AND MAGNET 20-40

## (undated) DEVICE — DRAPE FLUID WARMER 44 X 44"

## (undated) DEVICE — DRSG STERISTRIPS 0.5 X 4"

## (undated) DEVICE — SOL INJ NS 0.9% 1000ML

## (undated) DEVICE — LIGASURE MARYLAND 37CM

## (undated) DEVICE — STAPLER COVIDIEN ENDO GIA STANDARD HANDLE

## (undated) DEVICE — VENODYNE/SCD SLEEVE CALF MEDIUM

## (undated) DEVICE — SOL IRR POUR H2O 1500ML

## (undated) DEVICE — ENDOCATCH 10MM SPECIMEN POUCH

## (undated) DEVICE — ELCTR GROUNDING PAD ADULT COVIDIEN

## (undated) DEVICE — TROCAR COVIDIEN VERSAONE BLADELESS FIXATION 5MM STANDARD

## (undated) DEVICE — TIP METZENBAUM SCISSOR MONOPOLAR ENDOCUT (ORANGE)

## (undated) DEVICE — ANESTHESIA CIRCUIT ADULT HMEF

## (undated) DEVICE — TROCAR COVIDIEN BLUNT TIP HASSAN 12MM STANDARD

## (undated) DEVICE — POSITIONER FOAM EGG CRATE ULNAR 2PCS (PINK)

## (undated) DEVICE — PROTECTOR HEEL / ELBOW FLUFFY

## (undated) DEVICE — BLADE SURGICAL #10 CARBON

## (undated) DEVICE — TUBING OLYMPUS INSUFFLATION

## (undated) DEVICE — TUBING INSUFFLATION LAP FILTER 10FT

## (undated) DEVICE — ELCTR BOVIE BLADE 3/4" EXTENDED LENGTH 6"

## (undated) DEVICE — SHEARS COVIDIEN ENDO SHEAR 5MM X 31CM W UNIPOLAR CAUTERY

## (undated) DEVICE — BASIN SET SINGLE

## (undated) DEVICE — TROCAR COVIDIEN VERSAONE BLADED FIXATION 12MM STANDARD

## (undated) DEVICE — TROCAR COVIDIEN VERSAONE BLUNT TIP HASSAN 12MM

## (undated) DEVICE — SUT VICRYL 1 36" CT-1 UNDYED

## (undated) DEVICE — DRAPE 3/4 SHEET 52X76"

## (undated) DEVICE — PACK GENERAL LAPAROSCOPY

## (undated) DEVICE — STAPLER SKIN VISI-STAT 35 WIDE

## (undated) DEVICE — Device

## (undated) DEVICE — DRAPE TOWEL BLUE 17" X 24"

## (undated) DEVICE — TROCAR COVIDIEN BLUNT TIP HASSAN 12MM

## (undated) DEVICE — SUT MONOCRYL 4-0 27" PS-2 UNDYED

## (undated) DEVICE — GLV 7.5 PROTEXIS (WHITE)

## (undated) DEVICE — CANISTER DISPOSABLE THIN WALL 3000CC

## (undated) DEVICE — BLADE SURGICAL #15 CARBON

## (undated) DEVICE — DRSG TEGADERM 2.5X3"

## (undated) DEVICE — TUBING HYDRO-SURG PLUS IRRIGATOR W SMOKEVAC & PROBE

## (undated) DEVICE — LABELS BLANK W PEN

## (undated) DEVICE — SCOPE WARMER SEAL DISP

## (undated) DEVICE — SUT VICRYL 3-0 18" SH UNDYED (POP-OFF)

## (undated) DEVICE — POSITIONER STRAP ARMBOARD VELCRO TS-30